# Patient Record
Sex: FEMALE | Race: BLACK OR AFRICAN AMERICAN | NOT HISPANIC OR LATINO
[De-identification: names, ages, dates, MRNs, and addresses within clinical notes are randomized per-mention and may not be internally consistent; named-entity substitution may affect disease eponyms.]

---

## 2017-01-30 ENCOUNTER — APPOINTMENT (OUTPATIENT)
Dept: ORTHOPEDIC SURGERY | Facility: CLINIC | Age: 52
End: 2017-01-30

## 2017-01-30 RX ORDER — POLYETHYLENE GLYCOL 3350, SODIUM CHLORIDE, SODIUM BICARBONATE AND POTASSIUM CHLORIDE WITH LEMON FLAVOR 420; 11.2; 5.72; 1.48 G/4L; G/4L; G/4L; G/4L
420 POWDER, FOR SOLUTION ORAL
Qty: 4000 | Refills: 0 | Status: ACTIVE | COMMUNITY
Start: 2016-09-29

## 2017-01-30 RX ORDER — LISINOPRIL 2.5 MG/1
2.5 TABLET ORAL
Qty: 30 | Refills: 0 | Status: ACTIVE | COMMUNITY
Start: 2016-08-31

## 2017-01-30 RX ORDER — BLOOD SUGAR DIAGNOSTIC
STRIP MISCELLANEOUS
Qty: 100 | Refills: 0 | Status: ACTIVE | COMMUNITY
Start: 2016-08-16

## 2017-01-30 RX ORDER — ATORVASTATIN CALCIUM 10 MG/1
10 TABLET, FILM COATED ORAL
Qty: 30 | Refills: 0 | Status: ACTIVE | COMMUNITY
Start: 2016-08-31

## 2017-01-30 RX ORDER — GLIMEPIRIDE 2 MG/1
2 TABLET ORAL
Qty: 60 | Refills: 0 | Status: ACTIVE | COMMUNITY
Start: 2016-08-31

## 2017-01-30 RX ORDER — LANCETS 28 GAUGE
EACH MISCELLANEOUS
Qty: 100 | Refills: 0 | Status: ACTIVE | COMMUNITY
Start: 2016-08-16

## 2017-01-30 RX ORDER — ASPIRIN ENTERIC COATED TABLETS 81 MG 81 MG/1
81 TABLET, DELAYED RELEASE ORAL
Qty: 30 | Refills: 0 | Status: ACTIVE | COMMUNITY
Start: 2016-08-31

## 2017-01-30 RX ORDER — METFORMIN ER 500 MG 500 MG/1
500 TABLET ORAL
Qty: 120 | Refills: 0 | Status: ACTIVE | COMMUNITY
Start: 2016-10-06

## 2017-01-30 RX ORDER — DAPAGLIFLOZIN 5 MG/1
5 TABLET, FILM COATED ORAL
Qty: 30 | Refills: 0 | Status: ACTIVE | COMMUNITY
Start: 2017-01-04

## 2017-01-30 RX ORDER — VARENICLINE TARTRATE 0.5 MG/1
0.5 TABLET, FILM COATED ORAL
Qty: 60 | Refills: 0 | Status: ACTIVE | COMMUNITY
Start: 2017-01-04

## 2017-04-03 ENCOUNTER — APPOINTMENT (OUTPATIENT)
Dept: ORTHOPEDIC SURGERY | Facility: CLINIC | Age: 52
End: 2017-04-03

## 2017-06-28 ENCOUNTER — APPOINTMENT (OUTPATIENT)
Dept: ULTRASOUND IMAGING | Facility: CLINIC | Age: 52
End: 2017-06-28

## 2017-06-28 ENCOUNTER — OUTPATIENT (OUTPATIENT)
Dept: OUTPATIENT SERVICES | Facility: HOSPITAL | Age: 52
LOS: 1 days | End: 2017-06-28
Payer: MEDICAID

## 2017-06-28 DIAGNOSIS — Z98.89 OTHER SPECIFIED POSTPROCEDURAL STATES: Chronic | ICD-10-CM

## 2017-06-28 DIAGNOSIS — Z00.8 ENCOUNTER FOR OTHER GENERAL EXAMINATION: ICD-10-CM

## 2017-06-28 PROCEDURE — 76700 US EXAM ABDOM COMPLETE: CPT

## 2017-06-28 PROCEDURE — 76856 US EXAM PELVIC COMPLETE: CPT

## 2017-06-28 PROCEDURE — 76830 TRANSVAGINAL US NON-OB: CPT

## 2017-07-10 ENCOUNTER — APPOINTMENT (OUTPATIENT)
Dept: ORTHOPEDIC SURGERY | Facility: CLINIC | Age: 52
End: 2017-07-10
Payer: OTHER MISCELLANEOUS

## 2017-07-10 PROCEDURE — 99213 OFFICE O/P EST LOW 20 MIN: CPT

## 2017-10-02 ENCOUNTER — APPOINTMENT (OUTPATIENT)
Dept: ORTHOPEDIC SURGERY | Facility: CLINIC | Age: 52
End: 2017-10-02
Payer: OTHER MISCELLANEOUS

## 2017-10-02 DIAGNOSIS — M25.511 PAIN IN RIGHT SHOULDER: ICD-10-CM

## 2017-10-02 DIAGNOSIS — M25.512 PAIN IN LEFT SHOULDER: ICD-10-CM

## 2017-10-02 PROCEDURE — 73030 X-RAY EXAM OF SHOULDER: CPT | Mod: LT

## 2017-10-02 PROCEDURE — 99213 OFFICE O/P EST LOW 20 MIN: CPT

## 2017-10-11 ENCOUNTER — APPOINTMENT (OUTPATIENT)
Dept: MRI IMAGING | Facility: CLINIC | Age: 52
End: 2017-10-11
Payer: MEDICAID

## 2017-10-11 ENCOUNTER — OUTPATIENT (OUTPATIENT)
Dept: OUTPATIENT SERVICES | Facility: HOSPITAL | Age: 52
LOS: 1 days | End: 2017-10-11
Payer: MEDICAID

## 2017-10-11 DIAGNOSIS — Z98.89 OTHER SPECIFIED POSTPROCEDURAL STATES: Chronic | ICD-10-CM

## 2017-10-11 DIAGNOSIS — M25.511 PAIN IN RIGHT SHOULDER: ICD-10-CM

## 2017-10-11 PROCEDURE — 73221 MRI JOINT UPR EXTREM W/O DYE: CPT | Mod: 26,RT

## 2017-10-11 PROCEDURE — 73221 MRI JOINT UPR EXTREM W/O DYE: CPT

## 2017-10-30 ENCOUNTER — APPOINTMENT (OUTPATIENT)
Dept: ORTHOPEDIC SURGERY | Facility: CLINIC | Age: 52
End: 2017-10-30
Payer: MEDICAID

## 2017-10-30 PROCEDURE — 99213 OFFICE O/P EST LOW 20 MIN: CPT

## 2018-01-08 ENCOUNTER — APPOINTMENT (OUTPATIENT)
Dept: ORTHOPEDIC SURGERY | Facility: CLINIC | Age: 53
End: 2018-01-08
Payer: OTHER MISCELLANEOUS

## 2018-01-08 PROCEDURE — 99213 OFFICE O/P EST LOW 20 MIN: CPT

## 2018-01-08 RX ORDER — METHYLPREDNISOLONE 4 MG/1
4 TABLET ORAL
Qty: 1 | Refills: 0 | Status: ACTIVE | COMMUNITY
Start: 2018-01-08 | End: 1900-01-01

## 2018-01-29 ENCOUNTER — APPOINTMENT (OUTPATIENT)
Dept: ORTHOPEDIC SURGERY | Facility: CLINIC | Age: 53
End: 2018-01-29
Payer: MEDICAID

## 2018-01-29 PROCEDURE — 99213 OFFICE O/P EST LOW 20 MIN: CPT

## 2018-02-05 ENCOUNTER — APPOINTMENT (OUTPATIENT)
Dept: ORTHOPEDIC SURGERY | Facility: CLINIC | Age: 53
End: 2018-02-05

## 2018-04-02 ENCOUNTER — APPOINTMENT (OUTPATIENT)
Dept: ORTHOPEDIC SURGERY | Facility: CLINIC | Age: 53
End: 2018-04-02

## 2018-04-09 ENCOUNTER — APPOINTMENT (OUTPATIENT)
Dept: ORTHOPEDIC SURGERY | Facility: CLINIC | Age: 53
End: 2018-04-09
Payer: MEDICAID

## 2018-04-09 PROCEDURE — 99213 OFFICE O/P EST LOW 20 MIN: CPT

## 2018-05-30 ENCOUNTER — APPOINTMENT (OUTPATIENT)
Dept: ORTHOPEDIC SURGERY | Facility: CLINIC | Age: 53
End: 2018-05-30
Payer: OTHER MISCELLANEOUS

## 2018-05-30 DIAGNOSIS — S46.811D STRAIN OF OTHER MUSCLES, FASCIA AND TENDONS AT SHOULDER AND UPPER ARM LEVEL, RIGHT ARM, SUBSEQUENT ENCOUNTER: ICD-10-CM

## 2018-05-30 PROCEDURE — 99213 OFFICE O/P EST LOW 20 MIN: CPT

## 2018-07-23 ENCOUNTER — APPOINTMENT (OUTPATIENT)
Dept: ORTHOPEDIC SURGERY | Facility: CLINIC | Age: 53
End: 2018-07-23

## 2018-08-22 ENCOUNTER — APPOINTMENT (OUTPATIENT)
Dept: PAIN MANAGEMENT | Facility: CLINIC | Age: 53
End: 2018-08-22

## 2019-04-01 ENCOUNTER — APPOINTMENT (OUTPATIENT)
Dept: ORTHOPEDIC SURGERY | Facility: CLINIC | Age: 54
End: 2019-04-01
Payer: OTHER MISCELLANEOUS

## 2019-04-01 DIAGNOSIS — M67.912 UNSPECIFIED DISORDER OF SYNOVIUM AND TENDON, LEFT SHOULDER: ICD-10-CM

## 2019-04-01 DIAGNOSIS — M25.512 PAIN IN RIGHT SHOULDER: ICD-10-CM

## 2019-04-01 DIAGNOSIS — M75.101 UNSPECIFIED ROTATOR CUFF TEAR OR RUPTURE OF RIGHT SHOULDER, NOT SPECIFIED AS TRAUMATIC: ICD-10-CM

## 2019-04-01 DIAGNOSIS — M67.911 UNSPECIFIED DISORDER OF SYNOVIUM AND TENDON, RIGHT SHOULDER: ICD-10-CM

## 2019-04-01 DIAGNOSIS — M25.511 PAIN IN RIGHT SHOULDER: ICD-10-CM

## 2019-04-01 DIAGNOSIS — G89.29 PAIN IN RIGHT SHOULDER: ICD-10-CM

## 2019-04-01 DIAGNOSIS — M75.100 UNSPECIFIED ROTATOR CUFF TEAR OR RUPTURE OF UNSPECIFIED SHOULDER, NOT SPECIFIED AS TRAUMATIC: ICD-10-CM

## 2019-04-01 PROCEDURE — 99213 OFFICE O/P EST LOW 20 MIN: CPT

## 2019-06-29 ENCOUNTER — INPATIENT (INPATIENT)
Facility: HOSPITAL | Age: 54
LOS: 3 days | Discharge: HOME | DRG: 581 | End: 2019-07-03
Attending: HOSPITALIST | Admitting: HOSPITALIST
Payer: COMMERCIAL

## 2019-06-29 VITALS
WEIGHT: 220.02 LBS | DIASTOLIC BLOOD PRESSURE: 82 MMHG | HEART RATE: 95 BPM | OXYGEN SATURATION: 97 % | SYSTOLIC BLOOD PRESSURE: 121 MMHG | RESPIRATION RATE: 20 BRPM | HEIGHT: 66 IN | TEMPERATURE: 99 F

## 2019-06-29 DIAGNOSIS — Z98.89 OTHER SPECIFIED POSTPROCEDURAL STATES: Chronic | ICD-10-CM

## 2019-06-29 LAB
ACETONE SERPL-MCNC: NEGATIVE — SIGNIFICANT CHANGE UP
ALBUMIN SERPL ELPH-MCNC: 4.1 G/DL — SIGNIFICANT CHANGE UP (ref 3.3–5.2)
ALP SERPL-CCNC: 98 U/L — SIGNIFICANT CHANGE UP (ref 40–120)
ALT FLD-CCNC: 30 U/L — SIGNIFICANT CHANGE UP
ANION GAP SERPL CALC-SCNC: 11 MMOL/L — SIGNIFICANT CHANGE UP (ref 5–17)
AST SERPL-CCNC: 22 U/L — SIGNIFICANT CHANGE UP
BASOPHILS # BLD AUTO: 0.02 K/UL — SIGNIFICANT CHANGE UP (ref 0–0.2)
BASOPHILS NFR BLD AUTO: 0.2 % — SIGNIFICANT CHANGE UP (ref 0–2)
BILIRUB SERPL-MCNC: 0.2 MG/DL — LOW (ref 0.4–2)
BUN SERPL-MCNC: 11 MG/DL — SIGNIFICANT CHANGE UP (ref 8–20)
CALCIUM SERPL-MCNC: 9.6 MG/DL — SIGNIFICANT CHANGE UP (ref 8.6–10.2)
CHLORIDE SERPL-SCNC: 101 MMOL/L — SIGNIFICANT CHANGE UP (ref 98–107)
CO2 SERPL-SCNC: 26 MMOL/L — SIGNIFICANT CHANGE UP (ref 22–29)
CREAT SERPL-MCNC: 0.77 MG/DL — SIGNIFICANT CHANGE UP (ref 0.5–1.3)
EOSINOPHIL # BLD AUTO: 0.11 K/UL — SIGNIFICANT CHANGE UP (ref 0–0.5)
EOSINOPHIL NFR BLD AUTO: 1.2 % — SIGNIFICANT CHANGE UP (ref 0–6)
GLUCOSE SERPL-MCNC: 225 MG/DL — HIGH (ref 70–115)
HCG SERPL-ACNC: <4 MIU/ML — SIGNIFICANT CHANGE UP
HCT VFR BLD CALC: 42.5 % — SIGNIFICANT CHANGE UP (ref 34.5–45)
HGB BLD-MCNC: 14 G/DL — SIGNIFICANT CHANGE UP (ref 11.5–15.5)
IMM GRANULOCYTES NFR BLD AUTO: 0.3 % — SIGNIFICANT CHANGE UP (ref 0–1.5)
LYMPHOCYTES # BLD AUTO: 2.35 K/UL — SIGNIFICANT CHANGE UP (ref 1–3.3)
LYMPHOCYTES # BLD AUTO: 26.3 % — SIGNIFICANT CHANGE UP (ref 13–44)
MCHC RBC-ENTMCNC: 31 PG — SIGNIFICANT CHANGE UP (ref 27–34)
MCHC RBC-ENTMCNC: 32.9 GM/DL — SIGNIFICANT CHANGE UP (ref 32–36)
MCV RBC AUTO: 94.2 FL — SIGNIFICANT CHANGE UP (ref 80–100)
MONOCYTES # BLD AUTO: 0.8 K/UL — SIGNIFICANT CHANGE UP (ref 0–0.9)
MONOCYTES NFR BLD AUTO: 8.9 % — SIGNIFICANT CHANGE UP (ref 2–14)
NEUTROPHILS # BLD AUTO: 5.63 K/UL — SIGNIFICANT CHANGE UP (ref 1.8–7.4)
NEUTROPHILS NFR BLD AUTO: 63.1 % — SIGNIFICANT CHANGE UP (ref 43–77)
PLATELET # BLD AUTO: 258 K/UL — SIGNIFICANT CHANGE UP (ref 150–400)
POTASSIUM SERPL-MCNC: 4.3 MMOL/L — SIGNIFICANT CHANGE UP (ref 3.5–5.3)
POTASSIUM SERPL-SCNC: 4.3 MMOL/L — SIGNIFICANT CHANGE UP (ref 3.5–5.3)
PROT SERPL-MCNC: 7 G/DL — SIGNIFICANT CHANGE UP (ref 6.6–8.7)
RBC # BLD: 4.51 M/UL — SIGNIFICANT CHANGE UP (ref 3.8–5.2)
RBC # FLD: 13.7 % — SIGNIFICANT CHANGE UP (ref 10.3–14.5)
SODIUM SERPL-SCNC: 138 MMOL/L — SIGNIFICANT CHANGE UP (ref 135–145)
WBC # BLD: 8.94 K/UL — SIGNIFICANT CHANGE UP (ref 3.8–10.5)
WBC # FLD AUTO: 8.94 K/UL — SIGNIFICANT CHANGE UP (ref 3.8–10.5)

## 2019-06-29 PROCEDURE — 99285 EMERGENCY DEPT VISIT HI MDM: CPT

## 2019-06-29 RX ORDER — MORPHINE SULFATE 50 MG/1
4 CAPSULE, EXTENDED RELEASE ORAL ONCE
Refills: 0 | Status: DISCONTINUED | OUTPATIENT
Start: 2019-06-29 | End: 2019-06-29

## 2019-06-29 RX ADMIN — MORPHINE SULFATE 4 MILLIGRAM(S): 50 CAPSULE, EXTENDED RELEASE ORAL at 22:35

## 2019-06-29 RX ADMIN — Medication 100 MILLIGRAM(S): at 22:19

## 2019-06-29 RX ADMIN — MORPHINE SULFATE 4 MILLIGRAM(S): 50 CAPSULE, EXTENDED RELEASE ORAL at 22:20

## 2019-06-29 NOTE — ED STATDOCS - PROGRESS NOTE DETAILS
PT evaluated by intake physician. HPI/PE/ROS as noted above. Will follow up plan per intake physician. reviewed ct results, surg consult placed, spoke with surgery will do bedside incision and drainage admit to medicine for IV antibiotics

## 2019-06-29 NOTE — ED STATDOCS - ATTENDING CONTRIBUTION TO CARE
I, Maggie Oh, performed the initial face to face bedside interview with this patient regarding history of present illness, review of symptoms and relevant past medical, social and family history.  I completed an independent physical examination.  I was the initial provider who evaluated this patient. I have signed out the follow up of any pending tests (i.e. labs, radiological studies) to the ACP.  I have communicated the patient’s plan of care and disposition with the ACP.

## 2019-06-29 NOTE — ED STATDOCS - PSH
H/O Achilles tendon repair  right, 2001  H/O colonoscopy    History of arthroscopy of left shoulder  2013

## 2019-06-29 NOTE — ED STATDOCS - PHYSICAL EXAMINATION
Const: Awake, alert and oriented. In no acute distress. Well appearing.  HEENT: NC/AT. Moist mucous membranes.  Eyes: No scleral icterus. EOMI.  Neck:. Area of fluctuance from 10cm by 3 on posterior aspect and r side of neck and tracking down left trapezius area. No drainage, warm to touch. No overline erythema.   Cardiac: Regular rate and regular rhythm. +S1/S2. No murmurs. Peripheral pulses 2+ and symmetric. No LE edema.  Resp: Speaking in full sentences. No evidence of respiratory distress. No wheezes, rales or rhonchi.  Abd: Soft, non-tender, non-distended. Normal bowel sounds in all 4 quadrants. No guarding or rebound.  Back: Spine midline and non-tender. No CVAT.  Skin: No rashes, abrasions or lacerations.  Lymph: No cervical lymphadenopathy.  Neuro: Awake, alert & oriented x 3. Moves all extremities symmetrically. Const: Awake, alert and oriented. In no acute distress. Well appearing.  HEENT: NC/AT. Moist mucous membranes.  Eyes: No scleral icterus. EOMI.  Neck:. Area of fluctuance 10cm x 3cm on posterior aspect and right side of neck and tracking down left trapezius area. No drainage, warm to touch. No overlying erythema.   Cardiac: Regular rate and regular rhythm. +S1/S2. No murmurs. Peripheral pulses 2+ and symmetric. No LE edema.  Resp: Speaking in full sentences. No evidence of respiratory distress. No wheezes, rales or rhonchi.  Abd: Soft, non-tender, non-distended. Normal bowel sounds in all 4 quadrants. No guarding or rebound.  Back: Spine midline and non-tender. No CVAT.  Skin: No rashes, abrasions or lacerations.  Lymph: No cervical lymphadenopathy.  Neuro: Awake, alert & oriented x 3. CN II-XII symmetrically intact, normal speech, tongue midline. Moves all extremities symmetrically.

## 2019-06-29 NOTE — ED STATDOCS - CLINICAL SUMMARY MEDICAL DECISION MAKING FREE TEXT BOX
Patient with large neck abscess for last four days, at site of previous abscess. No fever, difficulties swallowing. However will check labs and CT to evaluate for extent of abscess and will start IV antibiotics. Patient with large neck abscess for last four days, at site of previous abscess. No fever, or difficulties swallowing or other neuro deficits. However will check labs and CT to evaluate for extent of abscess due to location and will start IV antibiotics. As abscess overlies spine, will not attempt I&D in ED without imaging results and consider surgical consult due to extent if abscess is tracking into deep spaces of neck.

## 2019-06-29 NOTE — ED STATDOCS - PMH
Achilles tendon injury  right  ADHD (attention deficit hyperactivity disorder)    H/O tear of meniscus of knee joint  left  Obesity (BMI 30-39.9)

## 2019-06-29 NOTE — ED STATDOCS - NS ED ROS FT
Const: Denies fever, chills  HEENT: Denies blurry vision, sore throat  Neck: Neck pain, mass to back of neck.   Resp: Denies coughing, SOB  Cardiovascular: Denies CP, palpitations, LE edema  GI: Denies nausea, vomiting, abdominal pain, diarrhea, constipation, blood in stool  : Denies urinary frequency/urgency/dysuria, hematuria  MSK: Denies back pain  Neuro: Denies HA, dizziness, numbness, weakness  Skin: Denies rashes. Const: Denies fever, chills  HEENT: Denies blurry vision, sore throat  Neck: + Neck pain, abscess to back of neck.   Resp: Denies coughing, SOB  Cardiovascular: Denies CP, palpitations, LE edema  GI: Denies nausea, vomiting, abdominal pain, diarrhea, constipation, blood in stool  : Denies urinary frequency/urgency/dysuria, hematuria  MSK: Denies back pain  Neuro: Denies HA, dizziness, numbness, weakness  Skin: Denies rashes.

## 2019-06-29 NOTE — ED STATDOCS - OBJECTIVE STATEMENT
53 year old female with past medical history of diabetes mellitus (Metformin) presents to the emergency room with complaints of neck pain and mass on neck for 3 days. Patient states mass has grown in size since noticing. Patient has history of abbesses but never to this extent. Patient took Tramadol this morning for pinched nerve in spine. Patient checks sugar levels at home with results of 296 this morning. Denies fever, sore throat, difficulties swallowing, cough, nausea, vomiting, diarrhea, headache, abdominal pain or chills. No additional complaints at this time.  Primary care: Rogelio 53 year old female with past medical history of diabetes mellitus (on insulin and Metformin) presents to the emergency room with complaints of neck pain and abscess on neck for 3 days. Patient states abscess has grown in size since she first noticed it. Patient has history of abbesses but never to this extent. Patient took Tramadol this morning for pinched nerve in spine, which did not improve her neck pain. Patient checks sugar levels at home with results of 296 this morning. Denies fever, sore throat, difficulties swallowing, cough, nausea, vomiting, diarrhea, headache, abdominal pain or chills. She has no allergies to medications, denies smoking, EtOH or illicit drugs.

## 2019-06-30 DIAGNOSIS — L02.91 CUTANEOUS ABSCESS, UNSPECIFIED: ICD-10-CM

## 2019-06-30 LAB
GLUCOSE BLDC GLUCOMTR-MCNC: 180 MG/DL — HIGH (ref 70–99)
GLUCOSE BLDC GLUCOMTR-MCNC: 256 MG/DL — HIGH (ref 70–99)
GLUCOSE BLDC GLUCOMTR-MCNC: 269 MG/DL — HIGH (ref 70–99)
LDH SERPL L TO P-CCNC: 193 U/L — HIGH (ref 98–192)

## 2019-06-30 PROCEDURE — 99221 1ST HOSP IP/OBS SF/LOW 40: CPT

## 2019-06-30 PROCEDURE — 99223 1ST HOSP IP/OBS HIGH 75: CPT

## 2019-06-30 PROCEDURE — 70491 CT SOFT TISSUE NECK W/DYE: CPT | Mod: 26

## 2019-06-30 RX ORDER — SODIUM CHLORIDE 9 MG/ML
1000 INJECTION, SOLUTION INTRAVENOUS
Refills: 0 | Status: DISCONTINUED | OUTPATIENT
Start: 2019-06-30 | End: 2019-07-03

## 2019-06-30 RX ORDER — PIPERACILLIN AND TAZOBACTAM 4; .5 G/20ML; G/20ML
4.5 INJECTION, POWDER, LYOPHILIZED, FOR SOLUTION INTRAVENOUS EVERY 8 HOURS
Refills: 0 | Status: DISCONTINUED | OUTPATIENT
Start: 2019-06-30 | End: 2019-06-30

## 2019-06-30 RX ORDER — ACETAMINOPHEN 500 MG
650 TABLET ORAL EVERY 6 HOURS
Refills: 0 | Status: DISCONTINUED | OUTPATIENT
Start: 2019-06-30 | End: 2019-07-03

## 2019-06-30 RX ORDER — INSULIN GLARGINE 100 [IU]/ML
30 INJECTION, SOLUTION SUBCUTANEOUS AT BEDTIME
Refills: 0 | Status: DISCONTINUED | OUTPATIENT
Start: 2019-06-30 | End: 2019-07-01

## 2019-06-30 RX ORDER — DEXTROSE 50 % IN WATER 50 %
15 SYRINGE (ML) INTRAVENOUS ONCE
Refills: 0 | Status: DISCONTINUED | OUTPATIENT
Start: 2019-06-30 | End: 2019-07-03

## 2019-06-30 RX ORDER — DEXTROSE 50 % IN WATER 50 %
25 SYRINGE (ML) INTRAVENOUS ONCE
Refills: 0 | Status: DISCONTINUED | OUTPATIENT
Start: 2019-06-30 | End: 2019-07-03

## 2019-06-30 RX ORDER — CYCLOBENZAPRINE HYDROCHLORIDE 10 MG/1
5 TABLET, FILM COATED ORAL THREE TIMES A DAY
Refills: 0 | Status: DISCONTINUED | OUTPATIENT
Start: 2019-06-30 | End: 2019-07-03

## 2019-06-30 RX ORDER — PIPERACILLIN AND TAZOBACTAM 4; .5 G/20ML; G/20ML
4.5 INJECTION, POWDER, LYOPHILIZED, FOR SOLUTION INTRAVENOUS ONCE
Refills: 0 | Status: COMPLETED | OUTPATIENT
Start: 2019-06-30 | End: 2019-06-30

## 2019-06-30 RX ORDER — PIPERACILLIN AND TAZOBACTAM 4; .5 G/20ML; G/20ML
4.5 INJECTION, POWDER, LYOPHILIZED, FOR SOLUTION INTRAVENOUS ONCE
Refills: 0 | Status: DISCONTINUED | OUTPATIENT
Start: 2019-06-30 | End: 2019-06-30

## 2019-06-30 RX ORDER — VANCOMYCIN HCL 1 G
1000 VIAL (EA) INTRAVENOUS EVERY 12 HOURS
Refills: 0 | Status: DISCONTINUED | OUTPATIENT
Start: 2019-06-30 | End: 2019-06-30

## 2019-06-30 RX ORDER — MORPHINE SULFATE 50 MG/1
4 CAPSULE, EXTENDED RELEASE ORAL EVERY 4 HOURS
Refills: 0 | Status: DISCONTINUED | OUTPATIENT
Start: 2019-06-30 | End: 2019-07-01

## 2019-06-30 RX ORDER — VANCOMYCIN HCL 1 G
1000 VIAL (EA) INTRAVENOUS EVERY 8 HOURS
Refills: 0 | Status: DISCONTINUED | OUTPATIENT
Start: 2019-06-30 | End: 2019-06-30

## 2019-06-30 RX ORDER — MORPHINE SULFATE 50 MG/1
2 CAPSULE, EXTENDED RELEASE ORAL EVERY 4 HOURS
Refills: 0 | Status: DISCONTINUED | OUTPATIENT
Start: 2019-06-30 | End: 2019-07-03

## 2019-06-30 RX ORDER — KETOROLAC TROMETHAMINE 30 MG/ML
30 SYRINGE (ML) INJECTION ONCE
Refills: 0 | Status: DISCONTINUED | OUTPATIENT
Start: 2019-06-30 | End: 2019-06-30

## 2019-06-30 RX ORDER — CEFAZOLIN SODIUM 1 G
2000 VIAL (EA) INJECTION EVERY 8 HOURS
Refills: 0 | Status: DISCONTINUED | OUTPATIENT
Start: 2019-06-30 | End: 2019-07-03

## 2019-06-30 RX ORDER — VANCOMYCIN HCL 1 G
VIAL (EA) INTRAVENOUS
Refills: 0 | Status: DISCONTINUED | OUTPATIENT
Start: 2019-06-30 | End: 2019-06-30

## 2019-06-30 RX ORDER — VANCOMYCIN HCL 1 G
1000 VIAL (EA) INTRAVENOUS ONCE
Refills: 0 | Status: COMPLETED | OUTPATIENT
Start: 2019-06-30 | End: 2019-06-30

## 2019-06-30 RX ORDER — LIDOCAINE HCL 20 MG/ML
20 VIAL (ML) INJECTION ONCE
Refills: 0 | Status: DISCONTINUED | OUTPATIENT
Start: 2019-06-30 | End: 2019-06-30

## 2019-06-30 RX ORDER — GLUCAGON INJECTION, SOLUTION 0.5 MG/.1ML
1 INJECTION, SOLUTION SUBCUTANEOUS ONCE
Refills: 0 | Status: DISCONTINUED | OUTPATIENT
Start: 2019-06-30 | End: 2019-07-03

## 2019-06-30 RX ORDER — INSULIN LISPRO 100/ML
VIAL (ML) SUBCUTANEOUS
Refills: 0 | Status: DISCONTINUED | OUTPATIENT
Start: 2019-06-30 | End: 2019-07-03

## 2019-06-30 RX ORDER — LIDOCAINE 4 G/100G
1 CREAM TOPICAL DAILY
Refills: 0 | Status: DISCONTINUED | OUTPATIENT
Start: 2019-06-30 | End: 2019-07-03

## 2019-06-30 RX ORDER — DEXTROSE 50 % IN WATER 50 %
12.5 SYRINGE (ML) INTRAVENOUS ONCE
Refills: 0 | Status: DISCONTINUED | OUTPATIENT
Start: 2019-06-30 | End: 2019-07-03

## 2019-06-30 RX ORDER — INSULIN LISPRO 100/ML
8 VIAL (ML) SUBCUTANEOUS
Refills: 0 | Status: DISCONTINUED | OUTPATIENT
Start: 2019-06-30 | End: 2019-07-01

## 2019-06-30 RX ADMIN — MORPHINE SULFATE 2 MILLIGRAM(S): 50 CAPSULE, EXTENDED RELEASE ORAL at 08:56

## 2019-06-30 RX ADMIN — PIPERACILLIN AND TAZOBACTAM 25 GRAM(S): 4; .5 INJECTION, POWDER, LYOPHILIZED, FOR SOLUTION INTRAVENOUS at 13:00

## 2019-06-30 RX ADMIN — MORPHINE SULFATE 2 MILLIGRAM(S): 50 CAPSULE, EXTENDED RELEASE ORAL at 07:57

## 2019-06-30 RX ADMIN — Medication 6: at 11:33

## 2019-06-30 RX ADMIN — Medication 100 MILLIGRAM(S): at 22:41

## 2019-06-30 RX ADMIN — Medication 250 MILLIGRAM(S): at 13:01

## 2019-06-30 RX ADMIN — INSULIN GLARGINE 30 UNIT(S): 100 INJECTION, SOLUTION SUBCUTANEOUS at 22:40

## 2019-06-30 RX ADMIN — Medication 30 MILLIGRAM(S): at 03:26

## 2019-06-30 RX ADMIN — LIDOCAINE 1 PATCH: 4 CREAM TOPICAL at 12:58

## 2019-06-30 RX ADMIN — MORPHINE SULFATE 4 MILLIGRAM(S): 50 CAPSULE, EXTENDED RELEASE ORAL at 22:53

## 2019-06-30 RX ADMIN — Medication 250 MILLIGRAM(S): at 07:57

## 2019-06-30 RX ADMIN — LIDOCAINE 1 PATCH: 4 CREAM TOPICAL at 21:11

## 2019-06-30 RX ADMIN — MORPHINE SULFATE 2 MILLIGRAM(S): 50 CAPSULE, EXTENDED RELEASE ORAL at 17:58

## 2019-06-30 RX ADMIN — PIPERACILLIN AND TAZOBACTAM 200 GRAM(S): 4; .5 INJECTION, POWDER, LYOPHILIZED, FOR SOLUTION INTRAVENOUS at 07:56

## 2019-06-30 NOTE — H&P ADULT - ASSESSMENT
1) Neck abscess - unclear why blood cultures not ordered  --> will order now  --> surgery consult appreicated  --> will consult id  --> iv abx vanco and zosyn    2) dm2 --> lantus, ssi  --> check a1c    3) back pain --> mri lower bakc ordered    4) pain --> lidociane patch  --> felxeril prn     5) dvt prop --> scds    6) diet -->< consistent carb

## 2019-06-30 NOTE — H&P ADULT - NSHPREVIEWOFSYSTEMS_GEN_ALL_CORE
REVIEW OF SYSTEMS:    CONSTITUTIONAL: pain  EYES: No eye pain, visual disturbances, or discharge  ENMT:  No difficulty hearing, tinnitus, vertigo; No sinus or throat pain  NECK: neck pain   BREASTS: No pain, masses, or nipple discharge  RESPIRATORY: No cough, wheezing, chills or hemoptysis; No shortness of breath  CARDIOVASCULAR: No chest pain, palpitations, dizziness, or leg swelling  GASTROINTESTINAL: No abdominal or epigastric pain. No nausea, vomiting, or hematemesis; No diarrhea or constipation. No melena or hematochezia.  GENITOURINARY: No dysuria, frequency, hematuria, or incontinence  NEUROLOGICAL: No headaches, memory loss, loss of strength, numbness, or tremors  SKIN: No itching, burning, rashes, or lesions   LYMPH NODES: No enlarged glands  ENDOCRINE: No heat or cold intolerance; No hair loss  MUSCULOSKELETAL: back pain   PSYCHIATRIC: No depression, anxiety, mood swings, or difficulty sleeping  HEME/LYMPH: No easy bruising, or bleeding gums  ALLERY AND IMMUNOLOGIC: No hives or eczema

## 2019-06-30 NOTE — CONSULT NOTE ADULT - ASSESSMENT
54y/o  Female with a h/o neck abscess, Uncontrolled DM type 2 on insulin,  Active smoker. Patient comes in from home with neck swelling and discomfort. Patient was in her usual state of health and started to develop the swelling a 3-4 days ago. Does not recall fever. No chills. In the ER patient was afebrile, no leukocytosis. Started on IV Vancomycin and Zosyn.       Neck Abscess  Uncontrolled DM type 2 on insulin      - Blood cultures pending  - CT neck with abscess  - Surgery following  - Will D/C Vancomycin and Zosyn  - Start Cefazolin 2gm IV q 8hours  - Start Clindamycin   - Trend Fever  - Trend Leukocytosis      Will Follow

## 2019-06-30 NOTE — CONSULT NOTE ADULT - ATTENDING COMMENTS
posterior neck abscess.  patient will benefit fro Incision and drainage however refusing to have it done at bedside. no need for OR.  agree with IV antibiotics and will try to convince patient to have drainage at a latter time

## 2019-06-30 NOTE — H&P ADULT - NSICDXPASTMEDICALHX_GEN_ALL_CORE_FT
PAST MEDICAL HISTORY:  Achilles tendon injury right    ADHD (attention deficit hyperactivity disorder)     H/O tear of meniscus of knee joint left    Obesity (BMI 30-39.9)

## 2019-06-30 NOTE — H&P ADULT - NSICDXFAMILYHX_GEN_ALL_CORE_FT
FAMILY HISTORY:  Sibling  Still living? Unknown  Family history of diabetes mellitus, Age at diagnosis: Age Unknown    Grandparent  Still living? No  Family history of breast cancer, Age at diagnosis: Age Unknown

## 2019-06-30 NOTE — H&P ADULT - HISTORY OF PRESENT ILLNESS
53 yof with pmh of neck abscess in the past, dm2 on insulin, presents from home with neck swelling and discomfort. Patient states she was previously treated for a neck abscess in a hospital in [facility]. Patient was in her usual state of health and started to develop the swelling a "few days back". Patient denies sob or dysphagia. Patient worked up in the er and found to have a neck abscess but refused bedside I and D. Patient seen at bedside and states feeling ok. Patient complains of chronic back pain. Patient denies any fever, chills, drug use or any other complaints.

## 2019-06-30 NOTE — CONSULT NOTE ADULT - SUBJECTIVE AND OBJECTIVE BOX
53 year old female presenting with chief complaint of neck pain. ACS/Trauma consulted to evaluate for neck abscess. Patient seen and examined at bedside. Reports that tala 6 months ago, she had similar pain and was found to have an abscess which was drained @ [facility] ED and was subsequently sent home. States for the past couple of days, she has been having similar sharp pain and tenderness similar to prior episodes. Of note, patient admits to being a poorly controlled diabetic on insulin with glucose levels consistently in 200-300 range at home. Patient currently refusing bedside I&D as she states this was what happened last time and procedure was very painful yet abscess recurred and does not want to proceed at this time. Demanding to be taken to the operating room for debridement immediately. ROS negative for fever, chills, nausea, vomiting, chest pain, SOB, abd pain, dysuria or changes in bowel habits.     PAST MEDICAL HISTORY:  Achilles tendon injury  Obesity (BMI 30-39.9)  H/O tear of meniscus of knee joint  ADHD (attention deficit hyperactivity disorder)    PAST SURGICAL HISTORY:  H/O colonoscopy  History of arthroscopy of left shoulder  Rotator cuff disorder  H/O Achilles tendon repair    ALLERGIES:  No Known Allergies    MEDICATIONS  (STANDING):    MEDICATIONS  (PRN):    VITALS & I/Os:  Vital Signs Last 24 Hrs  T(C): 36.9 (30 Jun 2019 04:38), Max: 37 (29 Jun 2019 20:01)  T(F): 98.4 (30 Jun 2019 04:38), Max: 98.6 (29 Jun 2019 20:01)  HR: 71 (30 Jun 2019 04:38) (71 - 95)  BP: 116/73 (30 Jun 2019 04:38) (116/73 - 121/82)  BP(mean): --  RR: 18 (30 Jun 2019 04:38) (18 - 20)  SpO2: 95% (30 Jun 2019 04:38) (95% - 97%)  CAPILLARY BLOOD GLUCOSE    I&O's Summary    Constitutional: Patient resting comfortably in bed in no acute distress   HEENT: EOMI/PERRL b/l  Neck: Well healed surgical incision over posterior midline neck with palpable firm tender mass; tenderness also present in posterior right neck; minimal surrounding cellulitis noted   Respiratory: CTAB with unlabored respirations, no accessory muscle use or conversational dyspnea   Cardiovascular: Regular rate and rhythm with no arrythmias or murmurs  Gastrointestinal: Abdomen soft, non-tender, non-distended with no rebound tenderness or guarding   Rectal: Not indicated   Neurological: GCS 15 ([id number]) with no gross sensory, motor or coordination deficits   Psychiatric: Normal mood and affect   Musculoskeletal: No joint pain, swelling or deformity with no limitation of movement     LABS:                        14.0   8.94  )-----------( 258      ( 29 Jun 2019 22:28 )             42.5     06-29    138  |  101  |  11.0  ----------------------------<  225<H>  4.3   |  26.0  |  0.77    Ca    9.6      29 Jun 2019 22:28    TPro  7.0  /  Alb  4.1  /  TBili  0.2<L>  /  DBili  x   /  AST  22  /  ALT  30  /  AlkPhos  98  06-29    Lactate:

## 2019-06-30 NOTE — H&P ADULT - NSICDXPASTSURGICALHX_GEN_ALL_CORE_FT
PAST SURGICAL HISTORY:  H/O Achilles tendon repair right, 2001    H/O colonoscopy     History of arthroscopy of left shoulder 2013

## 2019-06-30 NOTE — ED ADULT NURSE NOTE - NSIMPLEMENTINTERV_GEN_ALL_ED
Implemented All Universal Safety Interventions:  [place] to call system. Call bell, personal items and telephone within reach. Instruct patient to call for assistance. Room bathroom lighting operational. Non-slip footwear when patient is off stretcher. Physically safe environment: no spills, clutter or unnecessary equipment. Stretcher in lowest position, wheels locked, appropriate side rails in place.

## 2019-06-30 NOTE — CONSULT NOTE ADULT - SUBJECTIVE AND OBJECTIVE BOX
[facility]  INFECTIOUS DISEASES AND INTERNAL MEDICINE at [place]  =======================================================  Jeremiah Walsh MD  Diplomates American Board of Internal Medicine and Infectious Diseases  Tel: [phone]      Fax: [phone]  =======================================================      [id number]  JENNIFER CHRISTIE     CC: Patient is a 53y old  Female who presents with a chief complaint of neck abscess (30 Jun 2019 12:36)    54y/o  Female with a h/o neck abscess, Uncontrolled DM type 2 on insulin,  Active smoker. Patient comes in from home with neck swelling and discomfort. Patient was in her usual state of health and started to develop the swelling a 3-4 days ago. Does not recall fever. No chills. In the ER patient was afebrile, no leukocytosis. Started on IV Vancomycin and Zosyn. ID input requested.       Past Medical & Surgical Hx:  Achilles tendon injury: right  Obesity (BMI 30-39.9)  H/O tear of meniscus of knee joint: left  ADHD (attention deficit hyperactivity disorder)  H/O colonoscopy  History of arthroscopy of left shoulder: 2013  H/O Achilles tendon repair: right, 2001      Social Hx:  Active smoker      FAMILY HISTORY:  Family history of breast cancer (Grandparent)  Family history of diabetes mellitus (Sibling)      Allergies  No Known Allergies      Antibiotics:  piperacillin/tazobactam IVPB.. 4.5 Gram(s) IV Intermittent every 8 hours  vancomycin  IVPB 1000 milliGRAM(s) IV Intermittent every 8 hours       REVIEW OF SYSTEMS:  CONSTITUTIONAL:  No Fever or chills  HEENT:  No diplopia or blurred vision.  No earache, sore throat or runny nose.  CARDIOVASCULAR:  No pressure, squeezing, strangling, tightness, heaviness or aching about the chest, neck, axilla or epigastrium.  RESPIRATORY:  No cough, shortness of breath  GASTROINTESTINAL:  No nausea, vomiting or diarrhea.  GENITOURINARY:  No dysuria, frequency or urgency.   MUSCULOSKELETAL:  no joint aches, no muscle pain  SKIN:  + Neck Abscess   NEUROLOGIC:  No Headaches, seizures or weakness.  PSYCHIATRIC:  No disorder of thought or mood.  ENDOCRINE:  No heat or cold intolerance  HEMATOLOGICAL:  No easy bruising or bleeding.       Physical Exam:  Vital Signs Last 24 Hrs  T(C): 36.8 (30 Jun 2019 16:28), Max: 37 (29 Jun 2019 20:01)  T(F): 98.2 (30 Jun 2019 16:28), Max: 98.6 (29 Jun 2019 20:01)  HR: 74 (30 Jun 2019 16:28) (71 - 95)  BP: 139/90 (30 Jun 2019 16:28) (116/73 - 139/90)  RR: 18 (30 Jun 2019 16:28) (18 - 20)  SpO2: 97% (30 Jun 2019 16:28) (95% - 97%)  Height (cm): 167.64 (06-29 @ 20:01)  Weight (kg): 99.8 (06-29 @ 20:01)  BMI (kg/m2): 35.5 (06-29 @ 20:01)  BSA (m2): 2.08 (06-29 @ 20:01)      GEN: NAD, pleasant  HEENT: normocephalic and atraumatic. EOMI. PERRL.  Anicteric  NECK: Supple.   LUNGS: Clear to auscultation.  HEART: Regular rate and rhythm   ABDOMEN: Soft, nontender, and nondistended.  Positive bowel sounds.    : No CVA tenderness  EXTREMITIES: Without any edema.  MSK: No joint swelling  NEUROLOGIC:  No Focal Deficits  PSYCHIATRIC: Appropriate affect .  SKIN: Neck Abscess      Labs:  06-29    138  |  101  |  11.0  ----------------------------<  225<H>  4.3   |  26.0  |  0.77    Ca    9.6      29 Jun 2019 22:28    TPro  7.0  /  Alb  4.1  /  TBili  0.2<L>  /  DBili  x   /  AST  22  /  ALT  30  /  AlkPhos  98  06-29                          14.0   8.94  )-----------( 258      ( 29 Jun 2019 22:28 )             42.5     LIVER FUNCTIONS - ( 29 Jun 2019 22:28 )  Alb: 4.1 g/dL / Pro: 7.0 g/dL / ALK PHOS: 98 U/L / ALT: 30 U/L / AST: 22 U/L / GGT: x                 EXAM:  CT NECK SOFT TISSUE IC                        PROCEDURE DATE:  06/30/2019    INTERPRETATION:  CT NECK WITH CONTRAST  CLINICAL INDICATION: Neck soft tissue swelling with abscess.  TECHNIQUE: Contrast enhanced CT performed of the neck.  96 mL of Omnipaque 350 contrast material was injected IV. 4 mL was   discarded. Coronal and sagittal reformats were obtained.  COMPARISON: None.  FINDINGS:    2.0 x 2.9 x 3.5 cm peripherally enhancing collection within the posterior   midline neck, at the C2-C3 level. 2.4 x 1.3 x 1.0 cm peripherally   enhancing collection adjacent to the right of the larger midline   collection. Significant overlying skin thickening and surrounding   inflammatory change.  Numerous likely reactive cervical chain lymph nodes.  Visualized intracranial and intraorbital contents are unremarkable.   Paranasal sinuses are clear.  Nasopharynx and oropharynx are unremarkable. No prevertebral soft tissue   swelling.  Bilateral parotid, submandibular, and thyroid glands are unremarkable.   Visualized lung apices are clear. No significant stenosis within the   visualized major vascular structures.  IMPRESSION:    Two peripherally enhancing collections measuring 2.0 x 2.9 x 3.5 cm and   2.4x 1.3 x 1.0 cm in the posterior midline neck, and right of midline,   respectively, at the level of C2-C3. Overlying skin thickening and   surrounding inflammatory change.

## 2019-06-30 NOTE — CONSULT NOTE ADULT - ASSESSMENT
53 year old female with 2 distinct neck masses currently afebrile with normal WBC requiring incision and drainage (I&D). No acute indication for operating room incision and drainage at this time. ACS/Trauma recommends admission to medical team for IV ABx and reconsulting ACS/Trauma once patient is amenable to bedside I&D. Recommend warm compress for comfort.

## 2019-06-30 NOTE — H&P ADULT - NSHPPHYSICALEXAM_GEN_ALL_CORE
PHYSICAL EXAM:    GENERAL: NAD, lying on stomach   HEAD:  Atraumatic, Normocephalic  EYES: EOMI, PERRLA, conjunctiva and sclera clear  ENMT: neck tenderness, bandaged  NECK: Supple, No JVD, Normal thyroid  NERVOUS SYSTEM:  Alert & Oriented X3, Good concentration; Motor Strength 5/5 B/L upper and lower extremities; DTRs 2+ intact and symmetric  CHEST/LUNG: Clear to percussion bilaterally; No rales, rhonchi, wheezing, or rubs  HEART: Regular rate and rhythm; No murmurs, rubs, or gallops  ABDOMEN: Soft, Nontender, Nondistended; Bowel sounds present  EXTREMITIES:  2+ Peripheral Pulses, No clubbing, cyanosis, or edema  LYMPH: No lymphadenopathy noted  SKIN: No rashes or lesions

## 2019-07-01 LAB
ALBUMIN SERPL ELPH-MCNC: 3.7 G/DL — SIGNIFICANT CHANGE UP (ref 3.3–5.2)
ALP SERPL-CCNC: 90 U/L — SIGNIFICANT CHANGE UP (ref 40–120)
ALT FLD-CCNC: 23 U/L — SIGNIFICANT CHANGE UP
ANION GAP SERPL CALC-SCNC: 12 MMOL/L — SIGNIFICANT CHANGE UP (ref 5–17)
AST SERPL-CCNC: 16 U/L — SIGNIFICANT CHANGE UP
BILIRUB SERPL-MCNC: 0.3 MG/DL — LOW (ref 0.4–2)
BUN SERPL-MCNC: 12 MG/DL — SIGNIFICANT CHANGE UP (ref 8–20)
CALCIUM SERPL-MCNC: 9.1 MG/DL — SIGNIFICANT CHANGE UP (ref 8.6–10.2)
CHLORIDE SERPL-SCNC: 102 MMOL/L — SIGNIFICANT CHANGE UP (ref 98–107)
CO2 SERPL-SCNC: 24 MMOL/L — SIGNIFICANT CHANGE UP (ref 22–29)
CREAT SERPL-MCNC: 0.82 MG/DL — SIGNIFICANT CHANGE UP (ref 0.5–1.3)
GLUCOSE BLDC GLUCOMTR-MCNC: 213 MG/DL — HIGH (ref 70–99)
GLUCOSE BLDC GLUCOMTR-MCNC: 271 MG/DL — HIGH (ref 70–99)
GLUCOSE BLDC GLUCOMTR-MCNC: 317 MG/DL — HIGH (ref 70–99)
GLUCOSE BLDC GLUCOMTR-MCNC: 74 MG/DL — SIGNIFICANT CHANGE UP (ref 70–99)
GLUCOSE BLDC GLUCOMTR-MCNC: 82 MG/DL — SIGNIFICANT CHANGE UP (ref 70–99)
GLUCOSE SERPL-MCNC: 207 MG/DL — HIGH (ref 70–115)
HAV IGM SER-ACNC: SIGNIFICANT CHANGE UP
HBA1C BLD-MCNC: 11.1 % — HIGH (ref 4–5.6)
HBV CORE IGM SER-ACNC: SIGNIFICANT CHANGE UP
HBV SURFACE AG SER-ACNC: SIGNIFICANT CHANGE UP
HCT VFR BLD CALC: 43.1 % — SIGNIFICANT CHANGE UP (ref 34.5–45)
HCV AB S/CO SERPL IA: 0.07 S/CO — SIGNIFICANT CHANGE UP (ref 0–0.99)
HCV AB SERPL-IMP: SIGNIFICANT CHANGE UP
HGB BLD-MCNC: 14.1 G/DL — SIGNIFICANT CHANGE UP (ref 11.5–15.5)
MAGNESIUM SERPL-MCNC: 2.1 MG/DL — SIGNIFICANT CHANGE UP (ref 1.6–2.6)
MCHC RBC-ENTMCNC: 30.5 PG — SIGNIFICANT CHANGE UP (ref 27–34)
MCHC RBC-ENTMCNC: 32.7 GM/DL — SIGNIFICANT CHANGE UP (ref 32–36)
MCV RBC AUTO: 93.1 FL — SIGNIFICANT CHANGE UP (ref 80–100)
PLATELET # BLD AUTO: 263 K/UL — SIGNIFICANT CHANGE UP (ref 150–400)
POTASSIUM SERPL-MCNC: 4.2 MMOL/L — SIGNIFICANT CHANGE UP (ref 3.5–5.3)
POTASSIUM SERPL-SCNC: 4.2 MMOL/L — SIGNIFICANT CHANGE UP (ref 3.5–5.3)
PROCALCITONIN SERPL-MCNC: 0.04 NG/ML — SIGNIFICANT CHANGE UP (ref 0.02–0.1)
PROT SERPL-MCNC: 6.8 G/DL — SIGNIFICANT CHANGE UP (ref 6.6–8.7)
RBC # BLD: 4.63 M/UL — SIGNIFICANT CHANGE UP (ref 3.8–5.2)
RBC # FLD: 13.3 % — SIGNIFICANT CHANGE UP (ref 10.3–14.5)
SODIUM SERPL-SCNC: 138 MMOL/L — SIGNIFICANT CHANGE UP (ref 135–145)
VANCOMYCIN TROUGH SERPL-MCNC: <4 UG/ML — LOW (ref 10–20)
WBC # BLD: 6.22 K/UL — SIGNIFICANT CHANGE UP (ref 3.8–10.5)
WBC # FLD AUTO: 6.22 K/UL — SIGNIFICANT CHANGE UP (ref 3.8–10.5)

## 2019-07-01 PROCEDURE — 99233 SBSQ HOSP IP/OBS HIGH 50: CPT

## 2019-07-01 PROCEDURE — 99222 1ST HOSP IP/OBS MODERATE 55: CPT

## 2019-07-01 PROCEDURE — 72148 MRI LUMBAR SPINE W/O DYE: CPT | Mod: 26

## 2019-07-01 PROCEDURE — 99232 SBSQ HOSP IP/OBS MODERATE 35: CPT

## 2019-07-01 RX ORDER — HYDROMORPHONE HYDROCHLORIDE 2 MG/ML
1 INJECTION INTRAMUSCULAR; INTRAVENOUS; SUBCUTANEOUS EVERY 4 HOURS
Refills: 0 | Status: DISCONTINUED | OUTPATIENT
Start: 2019-07-01 | End: 2019-07-03

## 2019-07-01 RX ORDER — INSULIN GLARGINE 100 [IU]/ML
40 INJECTION, SOLUTION SUBCUTANEOUS AT BEDTIME
Refills: 0 | Status: DISCONTINUED | OUTPATIENT
Start: 2019-07-01 | End: 2019-07-03

## 2019-07-01 RX ORDER — GABAPENTIN 400 MG/1
300 CAPSULE ORAL THREE TIMES A DAY
Refills: 0 | Status: DISCONTINUED | OUTPATIENT
Start: 2019-07-01 | End: 2019-07-03

## 2019-07-01 RX ORDER — IBUPROFEN 200 MG
600 TABLET ORAL EVERY 8 HOURS
Refills: 0 | Status: COMPLETED | OUTPATIENT
Start: 2019-07-01 | End: 2019-07-03

## 2019-07-01 RX ORDER — SACCHAROMYCES BOULARDII 250 MG
250 POWDER IN PACKET (EA) ORAL
Refills: 0 | Status: DISCONTINUED | OUTPATIENT
Start: 2019-07-01 | End: 2019-07-03

## 2019-07-01 RX ADMIN — INSULIN GLARGINE 40 UNIT(S): 100 INJECTION, SOLUTION SUBCUTANEOUS at 23:18

## 2019-07-01 RX ADMIN — Medication 600 MILLIGRAM(S): at 23:17

## 2019-07-01 RX ADMIN — LIDOCAINE 1 PATCH: 4 CREAM TOPICAL at 01:38

## 2019-07-01 RX ADMIN — Medication 1 MILLIGRAM(S): at 07:43

## 2019-07-01 RX ADMIN — Medication 100 MILLIGRAM(S): at 14:44

## 2019-07-01 RX ADMIN — Medication 6: at 16:48

## 2019-07-01 RX ADMIN — Medication 4: at 08:59

## 2019-07-01 RX ADMIN — Medication 100 MILLIGRAM(S): at 23:16

## 2019-07-01 RX ADMIN — HYDROMORPHONE HYDROCHLORIDE 1 MILLIGRAM(S): 2 INJECTION INTRAMUSCULAR; INTRAVENOUS; SUBCUTANEOUS at 23:26

## 2019-07-01 RX ADMIN — Medication 8 UNIT(S): at 08:58

## 2019-07-01 RX ADMIN — HYDROMORPHONE HYDROCHLORIDE 1 MILLIGRAM(S): 2 INJECTION INTRAMUSCULAR; INTRAVENOUS; SUBCUTANEOUS at 15:53

## 2019-07-01 RX ADMIN — Medication 100 MILLIGRAM(S): at 06:05

## 2019-07-01 RX ADMIN — HYDROMORPHONE HYDROCHLORIDE 1 MILLIGRAM(S): 2 INJECTION INTRAMUSCULAR; INTRAVENOUS; SUBCUTANEOUS at 15:38

## 2019-07-01 RX ADMIN — GABAPENTIN 300 MILLIGRAM(S): 400 CAPSULE ORAL at 14:53

## 2019-07-01 RX ADMIN — Medication 250 MILLIGRAM(S): at 16:50

## 2019-07-01 RX ADMIN — GABAPENTIN 300 MILLIGRAM(S): 400 CAPSULE ORAL at 23:16

## 2019-07-01 RX ADMIN — Medication 100 MILLIGRAM(S): at 05:22

## 2019-07-01 RX ADMIN — Medication 600 MILLIGRAM(S): at 14:52

## 2019-07-01 RX ADMIN — Medication 600 MILLIGRAM(S): at 15:52

## 2019-07-01 RX ADMIN — Medication 100 MILLIGRAM(S): at 15:56

## 2019-07-01 RX ADMIN — LIDOCAINE 1 PATCH: 4 CREAM TOPICAL at 12:09

## 2019-07-01 RX ADMIN — Medication 100 MILLIGRAM(S): at 23:52

## 2019-07-01 RX ADMIN — MORPHINE SULFATE 4 MILLIGRAM(S): 50 CAPSULE, EXTENDED RELEASE ORAL at 06:11

## 2019-07-01 NOTE — CONSULT NOTE ADULT - SUBJECTIVE AND OBJECTIVE BOX
Patient is a 53y old  Female who presents with a chief complaint of neck abscess (30 Jun 2019 17:19)    HPI:  53F, active smoker, w/ neck abscess in the past, T2DM and obesity presents from home with a few day history of neck swelling and discomfort. Patient states she was previously treated for a neck abscess in a hospital in [facility]. Patient worked up in the er and found to have a neck abscess but refused bedside I and D. Patient seen at bedside and states feeling ok.       PAST MEDICAL & SURGICAL HISTORY:  Achilles tendon injury: right  Obesity (BMI 30-39.9)  H/O tear of meniscus of knee joint: left  ADHD (attention deficit hyperactivity disorder)  H/O colonoscopy  History of arthroscopy of left shoulder: 2013  H/O Achilles tendon repair: right, 2001      SH: lives at home. no EtOH. active smoking    FAMILY HISTORY:  Family history of breast cancer (Grandparent)  Family history of diabetes mellitus (Sibling)        Allergies    No Known Allergies      REVIEW OF SYSTEMS:    CONSTITUTIONAL: No fever, weight loss, or fatigue  EYES: No eye pain, visual disturbances, or discharge  ENMT:  No difficulty hearing, tinnitus, vertigo; No sinus or throat pain  NECK: No pain or stiffness  RESPIRATORY: No cough, wheezing, chills or hemoptysis; No shortness of breath  CARDIOVASCULAR: No chest pain, palpitations, dizziness, or leg swelling  GASTROINTESTINAL: No abdominal or epigastric pain. No nausea, vomiting, or hematemesis; No diarrhea or constipation. No melena or hematochezia.  NEUROLOGICAL: No headaches, memory loss, loss of strength, numbness, or tremors  SKIN: No itching, burning, rashes, or lesions   MUSCULOSKELETAL: No joint pain or swelling; No muscle, back, or extremity pain  PSYCHIATRIC: No depression, anxiety, mood swings, or difficulty sleeping        MEDICATIONS  (STANDING):  ceFAZolin   IVPB 2000 milliGRAM(s) IV Intermittent every 8 hours  clindamycin IVPB 600 milliGRAM(s) IV Intermittent every 8 hours  dextrose 5%. 1000 milliLiter(s) (50 mL/Hr) IV Continuous <Continuous>  dextrose 50% Injectable 12.5 Gram(s) IV Push once  dextrose 50% Injectable 25 Gram(s) IV Push once  dextrose 50% Injectable 25 Gram(s) IV Push once  gabapentin 300 milliGRAM(s) Oral three times a day  ibuprofen  Tablet. 600 milliGRAM(s) Oral every 8 hours  insulin glargine Injectable (LANTUS) 40 Unit(s) SubCutaneous at bedtime  insulin lispro (HumaLOG) corrective regimen sliding scale   SubCutaneous three times a day before meals  insulin lispro Injectable (HumaLOG) 8 Unit(s) SubCutaneous three times a day before meals  lidocaine   Patch 1 Patch Transdermal daily  saccharomyces boulardii 250 milliGRAM(s) Oral two times a day    MEDICATIONS  (PRN):  acetaminophen   Tablet .. 650 milliGRAM(s) Oral every 6 hours PRN Temp greater or equal to 38C (100.4F), Mild Pain (1 - 3)  cyclobenzaprine 5 milliGRAM(s) Oral three times a day PRN Muscle Spasm  dextrose 40% Gel 15 Gram(s) Oral once PRN Blood Glucose LESS THAN 70 milliGRAM(s)/deciliter  glucagon  Injectable 1 milliGRAM(s) IntraMuscular once PRN Glucose LESS THAN 70 milligrams/deciliter  LORazepam   Injectable 1 milliGRAM(s) IV Push once PRN mri  morphine  - Injectable 2 milliGRAM(s) IV Push every 4 hours PRN Moderate Pain (4 - 6)  morphine  - Injectable 4 milliGRAM(s) IV Push every 4 hours PRN Severe Pain (7 - 10)      Vital Signs Last 24 Hrs  T(C): 36.8 (01 Jul 2019 10:31), Max: 37.1 (30 Jun 2019 23:07)  T(F): 98.3 (01 Jul 2019 10:31), Max: 98.8 (30 Jun 2019 23:07)  HR: 73 (01 Jul 2019 10:31) (73 - 91)  BP: 148/93 (01 Jul 2019 10:31) (117/63 - 148/93)  BP(mean): --  RR: 18 (01 Jul 2019 10:31) (18 - 18)  SpO2: 98% (01 Jul 2019 10:31) (93% - 98%)      PHYSICAL EXAM:    Constitutional: NAD, obese  HEENT: EOMI, no exophalmos  Neck: +neck mass, no thyroid enlargement  Respiratory: CTAB  Cardiovascular: S1 and S2, RRR  Gastrointestinal: BS+, soft, ntnd  Extremities: No peripheral edema  Neurological: A/O x 3, no focal deficits  Psychiatric: Normal mood, normal affect  Skin: No rashes, no acanthosis        LABS  07-01    138  |  102  |  12.0  ----------------------------<  207<H>  4.2   |  24.0  |  0.82    Ca    9.1      01 Jul 2019 07:09  Mg     2.1     07-01    TPro  6.8  /  Alb  3.7  /  TBili  0.3<L>  /  DBili  x   /  AST  16  /  ALT  23  /  AlkPhos  90  07-01                          14.1   6.22  )-----------( 263      ( 01 Jul 2019 07:09 )             43.1       Hemoglobin A1C, Whole Blood: 11.1 % (07-01 @ 07:09)      Aspartate Aminotransferase (AST/SGOT): 16 U/L (07-01-19 @ 07:09)  Alkaline Phosphatase, Serum: 90 U/L (07-01-19 @ 07:09)  Alanine Aminotransferase (ALT/SGPT): 23 U/L (07-01-19 @ 07:09)  Albumin, Serum: 3.7 g/dL (07-01-19 @ 07:09)  Aspartate Aminotransferase (AST/SGOT): 22 U/L (06-29-19 @ 22:28)  Alkaline Phosphatase, Serum: 98 U/L (06-29-19 @ 22:28)  Albumin, Serum: 4.1 g/dL (06-29-19 @ 22:28)  Alanine Aminotransferase (ALT/SGPT): 30 U/L (06-29-19 @ 22:28)      CAPILLARY BLOOD GLUCOSE      POCT Blood Glucose.: 74 mg/dL (01 Jul 2019 12:05)  POCT Blood Glucose.: 82 mg/dL (01 Jul 2019 12:04)  POCT Blood Glucose.: 213 mg/dL (01 Jul 2019 08:54)  POCT Blood Glucose.: 269 mg/dL (30 Jun 2019 22:39)  POCT Blood Glucose.: 180 mg/dL (30 Jun 2019 16:48) Patient is a 53y old  Female who presents with a chief complaint of neck abscess (30 Jun 2019 17:19)    HPI:  53F, active smoker, w/ neck abscess in the past, T2DM, HTN, HLD and obesity presents from home with a few day history of neck swelling and discomfort. Patient states she was previously treated for a neck abscess in a hospital in [facility]. Patient worked up in the er and found to have a neck abscess but refused bedside I and D. Patient seen at bedside and states feeling ok.   sees endocrinologist at [facility]  has had diabetes for last 2 years  meds: metformin 500mg 1 tab daily, glimepiride 4mg BID, atorvastatin, lisinopril, basaglar 100 units, humalog 15 units TID  denies any complications  does not check FS very oten  has calluses and sees podiatrist  changed eating habits last month  FH of diabetes in mom, brother, nieces    PAST MEDICAL & SURGICAL HISTORY:  Achilles tendon injury: right  Obesity (BMI 30-39.9)  H/O tear of meniscus of knee joint: left  ADHD (attention deficit hyperactivity disorder)  H/O colonoscopy  History of arthroscopy of left shoulder: 2013  H/O Achilles tendon repair: right, 2001      SH: lives at home. no EtOH. active smoking    FAMILY HISTORY:  Family history of breast cancer (Grandparent)  Family history of diabetes mellitus (Sibling)        Allergies    No Known Allergies      REVIEW OF SYSTEMS:    CONSTITUTIONAL: No fever, weight loss, or fatigue  EYES: No eye pain, visual disturbances, or discharge  ENMT:  No difficulty hearing, tinnitus, vertigo; No sinus or throat pain  NECK: No pain or stiffness  RESPIRATORY: No cough, wheezing, chills or hemoptysis; No shortness of breath  CARDIOVASCULAR: No chest pain, palpitations, dizziness, or leg swelling  GASTROINTESTINAL: No abdominal or epigastric pain. No nausea, vomiting, or hematemesis; No diarrhea or constipation. No melena or hematochezia.  NEUROLOGICAL: No headaches, memory loss, loss of strength, numbness, or tremors  SKIN: No itching, burning, rashes, or lesions   MUSCULOSKELETAL: No joint pain or swelling; No muscle, back, or extremity pain  PSYCHIATRIC: No depression, anxiety, mood swings, or difficulty sleeping        MEDICATIONS  (STANDING):  ceFAZolin   IVPB 2000 milliGRAM(s) IV Intermittent every 8 hours  clindamycin IVPB 600 milliGRAM(s) IV Intermittent every 8 hours  dextrose 5%. 1000 milliLiter(s) (50 mL/Hr) IV Continuous <Continuous>  dextrose 50% Injectable 12.5 Gram(s) IV Push once  dextrose 50% Injectable 25 Gram(s) IV Push once  dextrose 50% Injectable 25 Gram(s) IV Push once  gabapentin 300 milliGRAM(s) Oral three times a day  ibuprofen  Tablet. 600 milliGRAM(s) Oral every 8 hours  insulin glargine Injectable (LANTUS) 40 Unit(s) SubCutaneous at bedtime  insulin lispro (HumaLOG) corrective regimen sliding scale   SubCutaneous three times a day before meals  insulin lispro Injectable (HumaLOG) 8 Unit(s) SubCutaneous three times a day before meals  lidocaine   Patch 1 Patch Transdermal daily  saccharomyces boulardii 250 milliGRAM(s) Oral two times a day    MEDICATIONS  (PRN):  acetaminophen   Tablet .. 650 milliGRAM(s) Oral every 6 hours PRN Temp greater or equal to 38C (100.4F), Mild Pain (1 - 3)  cyclobenzaprine 5 milliGRAM(s) Oral three times a day PRN Muscle Spasm  dextrose 40% Gel 15 Gram(s) Oral once PRN Blood Glucose LESS THAN 70 milliGRAM(s)/deciliter  glucagon  Injectable 1 milliGRAM(s) IntraMuscular once PRN Glucose LESS THAN 70 milligrams/deciliter  LORazepam   Injectable 1 milliGRAM(s) IV Push once PRN mri  morphine  - Injectable 2 milliGRAM(s) IV Push every 4 hours PRN Moderate Pain (4 - 6)  morphine  - Injectable 4 milliGRAM(s) IV Push every 4 hours PRN Severe Pain (7 - 10)      Vital Signs Last 24 Hrs  T(C): 36.8 (01 Jul 2019 10:31), Max: 37.1 (30 Jun 2019 23:07)  T(F): 98.3 (01 Jul 2019 10:31), Max: 98.8 (30 Jun 2019 23:07)  HR: 73 (01 Jul 2019 10:31) (73 - 91)  BP: 148/93 (01 Jul 2019 10:31) (117/63 - 148/93)  BP(mean): --  RR: 18 (01 Jul 2019 10:31) (18 - 18)  SpO2: 98% (01 Jul 2019 10:31) (93% - 98%)      PHYSICAL EXAM:    Constitutional: NAD, obese  HEENT: EOMI, no exophalmos  Neck: palpable posterior neck mass w/ purulent discharge, no thyroid enlargement  Respiratory: CTAB  Cardiovascular: S1 and S2, RRR  Gastrointestinal: BS+, soft, ntnd  Extremities: No peripheral edema  Neurological: A/O x 3, no focal deficits  Psychiatric: Normal mood, normal affect  Skin: No rashes, no acanthosis        LABS  07-01    138  |  102  |  12.0  ----------------------------<  207<H>  4.2   |  24.0  |  0.82    Ca    9.1      01 Jul 2019 07:09  Mg     2.1     07-01    TPro  6.8  /  Alb  3.7  /  TBili  0.3<L>  /  DBili  x   /  AST  16  /  ALT  23  /  AlkPhos  90  07-01                          14.1   6.22  )-----------( 263      ( 01 Jul 2019 07:09 )             43.1       Hemoglobin A1C, Whole Blood: 11.1 % (07-01 @ 07:09)      Aspartate Aminotransferase (AST/SGOT): 16 U/L (07-01-19 @ 07:09)  Alkaline Phosphatase, Serum: 90 U/L (07-01-19 @ 07:09)  Alanine Aminotransferase (ALT/SGPT): 23 U/L (07-01-19 @ 07:09)  Albumin, Serum: 3.7 g/dL (07-01-19 @ 07:09)  Aspartate Aminotransferase (AST/SGOT): 22 U/L (06-29-19 @ 22:28)  Alkaline Phosphatase, Serum: 98 U/L (06-29-19 @ 22:28)  Albumin, Serum: 4.1 g/dL (06-29-19 @ 22:28)  Alanine Aminotransferase (ALT/SGPT): 30 U/L (06-29-19 @ 22:28)      CAPILLARY BLOOD GLUCOSE      POCT Blood Glucose.: 74 mg/dL (01 Jul 2019 12:05)  POCT Blood Glucose.: 82 mg/dL (01 Jul 2019 12:04)  POCT Blood Glucose.: 213 mg/dL (01 Jul 2019 08:54)  POCT Blood Glucose.: 269 mg/dL (30 Jun 2019 22:39)  POCT Blood Glucose.: 180 mg/dL (30 Jun 2019 16:48)

## 2019-07-01 NOTE — CONSULT NOTE ADULT - ASSESSMENT
53F, active smoker, w/ neck abscess in the past, T2DM and obesity presents from home with a few day history of neck swelling and discomfort. Patient states she was previously treated for a neck abscess in a hospital in [facility]. Patient worked up in the er and found to have a neck abscess but refused bedside I and D. Patient seen at bedside and states feeling ok.     T2DM- FS well controlled   -A1c 11.1  -continue lantus 40 units  -continue humalog 8 units TID  -continue sliding scale  -should see nutritionist    Neck mass- being followed by ID. patient is an active smoker, at risk for malignancy    Obesity- needs to work on diet and exercise. 53F, active smoker, w/ neck abscess in the past, T2DM, HTN, HLD and obesity presents from home with a few day history of neck swelling and discomfort. Patient states she was previously treated for a neck abscess in a hospital in [facility]. Patient worked up in the er and found to have a neck abscess but refused bedside I and D. Patient seen at bedside and states feeling ok.     T2DM- FS well controlled   -A1c 11.1  -decrease to lantus 30 units  -continue humalog 8 units TID  -continue sliding scale  -should see nutritionist    HLD- on statin, would restart    HTN- above goal. on lisinopril at home. suggest restarting    Neck mass- being followed by ID. patient is an active smoker, at risk for malignancy

## 2019-07-02 LAB
ALBUMIN SERPL ELPH-MCNC: 3.5 G/DL — SIGNIFICANT CHANGE UP (ref 3.3–5.2)
ALP SERPL-CCNC: 86 U/L — SIGNIFICANT CHANGE UP (ref 40–120)
ALT FLD-CCNC: 22 U/L — SIGNIFICANT CHANGE UP
AMPHET UR-MCNC: NEGATIVE — SIGNIFICANT CHANGE UP
ANION GAP SERPL CALC-SCNC: 10 MMOL/L — SIGNIFICANT CHANGE UP (ref 5–17)
AST SERPL-CCNC: 18 U/L — SIGNIFICANT CHANGE UP
BARBITURATES UR SCN-MCNC: NEGATIVE — SIGNIFICANT CHANGE UP
BENZODIAZ UR-MCNC: NEGATIVE — SIGNIFICANT CHANGE UP
BILIRUB SERPL-MCNC: <0.2 MG/DL — LOW (ref 0.4–2)
BUN SERPL-MCNC: 11 MG/DL — SIGNIFICANT CHANGE UP (ref 8–20)
CALCIUM SERPL-MCNC: 9.4 MG/DL — SIGNIFICANT CHANGE UP (ref 8.6–10.2)
CHLORIDE SERPL-SCNC: 103 MMOL/L — SIGNIFICANT CHANGE UP (ref 98–107)
CO2 SERPL-SCNC: 25 MMOL/L — SIGNIFICANT CHANGE UP (ref 22–29)
COCAINE METAB.OTHER UR-MCNC: NEGATIVE — SIGNIFICANT CHANGE UP
CREAT SERPL-MCNC: 0.87 MG/DL — SIGNIFICANT CHANGE UP (ref 0.5–1.3)
GLUCOSE BLDC GLUCOMTR-MCNC: 140 MG/DL — HIGH (ref 70–99)
GLUCOSE BLDC GLUCOMTR-MCNC: 181 MG/DL — HIGH (ref 70–99)
GLUCOSE BLDC GLUCOMTR-MCNC: 202 MG/DL — HIGH (ref 70–99)
GLUCOSE BLDC GLUCOMTR-MCNC: 271 MG/DL — HIGH (ref 70–99)
GLUCOSE BLDC GLUCOMTR-MCNC: 285 MG/DL — HIGH (ref 70–99)
GLUCOSE SERPL-MCNC: 249 MG/DL — HIGH (ref 70–115)
HCT VFR BLD CALC: 42.1 % — SIGNIFICANT CHANGE UP (ref 34.5–45)
HGB BLD-MCNC: 13.7 G/DL — SIGNIFICANT CHANGE UP (ref 11.5–15.5)
MAGNESIUM SERPL-MCNC: 2.2 MG/DL — SIGNIFICANT CHANGE UP (ref 1.8–2.6)
MCHC RBC-ENTMCNC: 30.7 PG — SIGNIFICANT CHANGE UP (ref 27–34)
MCHC RBC-ENTMCNC: 32.5 GM/DL — SIGNIFICANT CHANGE UP (ref 32–36)
MCV RBC AUTO: 94.4 FL — SIGNIFICANT CHANGE UP (ref 80–100)
METHADONE UR-MCNC: NEGATIVE — SIGNIFICANT CHANGE UP
OPIATES UR-MCNC: POSITIVE
PCP SPEC-MCNC: SIGNIFICANT CHANGE UP
PCP UR-MCNC: NEGATIVE — SIGNIFICANT CHANGE UP
PLATELET # BLD AUTO: 285 K/UL — SIGNIFICANT CHANGE UP (ref 150–400)
POTASSIUM SERPL-MCNC: 4.7 MMOL/L — SIGNIFICANT CHANGE UP (ref 3.5–5.3)
POTASSIUM SERPL-SCNC: 4.7 MMOL/L — SIGNIFICANT CHANGE UP (ref 3.5–5.3)
PROT SERPL-MCNC: 6.5 G/DL — LOW (ref 6.6–8.7)
RBC # BLD: 4.46 M/UL — SIGNIFICANT CHANGE UP (ref 3.8–5.2)
RBC # FLD: 13.6 % — SIGNIFICANT CHANGE UP (ref 10.3–14.5)
SODIUM SERPL-SCNC: 138 MMOL/L — SIGNIFICANT CHANGE UP (ref 135–145)
THC UR QL: NEGATIVE — SIGNIFICANT CHANGE UP
WBC # BLD: 5.15 K/UL — SIGNIFICANT CHANGE UP (ref 3.8–10.5)
WBC # FLD AUTO: 5.15 K/UL — SIGNIFICANT CHANGE UP (ref 3.8–10.5)

## 2019-07-02 PROCEDURE — 99231 SBSQ HOSP IP/OBS SF/LOW 25: CPT

## 2019-07-02 PROCEDURE — 99232 SBSQ HOSP IP/OBS MODERATE 35: CPT

## 2019-07-02 PROCEDURE — 99233 SBSQ HOSP IP/OBS HIGH 50: CPT

## 2019-07-02 RX ORDER — ATORVASTATIN CALCIUM 80 MG/1
20 TABLET, FILM COATED ORAL AT BEDTIME
Refills: 0 | Status: DISCONTINUED | OUTPATIENT
Start: 2019-07-02 | End: 2019-07-03

## 2019-07-02 RX ORDER — INSULIN LISPRO 100/ML
10 VIAL (ML) SUBCUTANEOUS
Refills: 0 | Status: DISCONTINUED | OUTPATIENT
Start: 2019-07-02 | End: 2019-07-03

## 2019-07-02 RX ORDER — INSULIN LISPRO 100/ML
5 VIAL (ML) SUBCUTANEOUS
Refills: 0 | Status: DISCONTINUED | OUTPATIENT
Start: 2019-07-02 | End: 2019-07-02

## 2019-07-02 RX ORDER — LISINOPRIL 2.5 MG/1
10 TABLET ORAL DAILY
Refills: 0 | Status: DISCONTINUED | OUTPATIENT
Start: 2019-07-02 | End: 2019-07-03

## 2019-07-02 RX ORDER — DOCUSATE SODIUM 100 MG
100 CAPSULE ORAL
Refills: 0 | Status: DISCONTINUED | OUTPATIENT
Start: 2019-07-02 | End: 2019-07-03

## 2019-07-02 RX ORDER — SENNA PLUS 8.6 MG/1
2 TABLET ORAL AT BEDTIME
Refills: 0 | Status: DISCONTINUED | OUTPATIENT
Start: 2019-07-02 | End: 2019-07-03

## 2019-07-02 RX ADMIN — HYDROMORPHONE HYDROCHLORIDE 1 MILLIGRAM(S): 2 INJECTION INTRAMUSCULAR; INTRAVENOUS; SUBCUTANEOUS at 21:45

## 2019-07-02 RX ADMIN — HYDROMORPHONE HYDROCHLORIDE 1 MILLIGRAM(S): 2 INJECTION INTRAMUSCULAR; INTRAVENOUS; SUBCUTANEOUS at 16:31

## 2019-07-02 RX ADMIN — Medication 100 MILLIGRAM(S): at 05:39

## 2019-07-02 RX ADMIN — ATORVASTATIN CALCIUM 20 MILLIGRAM(S): 80 TABLET, FILM COATED ORAL at 21:00

## 2019-07-02 RX ADMIN — Medication 4: at 11:28

## 2019-07-02 RX ADMIN — LIDOCAINE 1 PATCH: 4 CREAM TOPICAL at 16:08

## 2019-07-02 RX ADMIN — HYDROMORPHONE HYDROCHLORIDE 1 MILLIGRAM(S): 2 INJECTION INTRAMUSCULAR; INTRAVENOUS; SUBCUTANEOUS at 13:26

## 2019-07-02 RX ADMIN — HYDROMORPHONE HYDROCHLORIDE 1 MILLIGRAM(S): 2 INJECTION INTRAMUSCULAR; INTRAVENOUS; SUBCUTANEOUS at 08:04

## 2019-07-02 RX ADMIN — Medication 100 MILLIGRAM(S): at 14:46

## 2019-07-02 RX ADMIN — Medication 100 MILLIGRAM(S): at 21:00

## 2019-07-02 RX ADMIN — Medication 5 UNIT(S): at 11:27

## 2019-07-02 RX ADMIN — HYDROMORPHONE HYDROCHLORIDE 1 MILLIGRAM(S): 2 INJECTION INTRAMUSCULAR; INTRAVENOUS; SUBCUTANEOUS at 12:15

## 2019-07-02 RX ADMIN — HYDROMORPHONE HYDROCHLORIDE 1 MILLIGRAM(S): 2 INJECTION INTRAMUSCULAR; INTRAVENOUS; SUBCUTANEOUS at 21:50

## 2019-07-02 RX ADMIN — GABAPENTIN 300 MILLIGRAM(S): 400 CAPSULE ORAL at 14:46

## 2019-07-02 RX ADMIN — SENNA PLUS 2 TABLET(S): 8.6 TABLET ORAL at 21:00

## 2019-07-02 RX ADMIN — GABAPENTIN 300 MILLIGRAM(S): 400 CAPSULE ORAL at 05:40

## 2019-07-02 RX ADMIN — Medication 250 MILLIGRAM(S): at 05:40

## 2019-07-02 RX ADMIN — Medication 600 MILLIGRAM(S): at 22:18

## 2019-07-02 RX ADMIN — GABAPENTIN 300 MILLIGRAM(S): 400 CAPSULE ORAL at 21:00

## 2019-07-02 RX ADMIN — HYDROMORPHONE HYDROCHLORIDE 1 MILLIGRAM(S): 2 INJECTION INTRAMUSCULAR; INTRAVENOUS; SUBCUTANEOUS at 09:15

## 2019-07-02 RX ADMIN — Medication 600 MILLIGRAM(S): at 15:04

## 2019-07-02 RX ADMIN — LISINOPRIL 10 MILLIGRAM(S): 2.5 TABLET ORAL at 11:25

## 2019-07-02 RX ADMIN — Medication 250 MILLIGRAM(S): at 16:31

## 2019-07-02 RX ADMIN — LIDOCAINE 1 PATCH: 4 CREAM TOPICAL at 11:25

## 2019-07-02 RX ADMIN — Medication 100 MILLIGRAM(S): at 21:01

## 2019-07-02 RX ADMIN — Medication 10 UNIT(S): at 16:31

## 2019-07-02 RX ADMIN — Medication 600 MILLIGRAM(S): at 21:45

## 2019-07-02 RX ADMIN — Medication 600 MILLIGRAM(S): at 00:00

## 2019-07-02 RX ADMIN — Medication 600 MILLIGRAM(S): at 05:40

## 2019-07-02 RX ADMIN — Medication 600 MILLIGRAM(S): at 14:45

## 2019-07-02 RX ADMIN — Medication 6: at 08:08

## 2019-07-02 RX ADMIN — INSULIN GLARGINE 40 UNIT(S): 100 INJECTION, SOLUTION SUBCUTANEOUS at 22:09

## 2019-07-03 ENCOUNTER — TRANSCRIPTION ENCOUNTER (OUTPATIENT)
Age: 54
End: 2019-07-03

## 2019-07-03 VITALS
RESPIRATION RATE: 18 BRPM | HEART RATE: 75 BPM | TEMPERATURE: 98 F | SYSTOLIC BLOOD PRESSURE: 122 MMHG | OXYGEN SATURATION: 99 % | DIASTOLIC BLOOD PRESSURE: 81 MMHG

## 2019-07-03 DIAGNOSIS — M51.26 OTHER INTERVERTEBRAL DISC DISPLACEMENT, LUMBAR REGION: ICD-10-CM

## 2019-07-03 LAB
GLUCOSE BLDC GLUCOMTR-MCNC: 127 MG/DL — HIGH (ref 70–99)
GLUCOSE BLDC GLUCOMTR-MCNC: 206 MG/DL — HIGH (ref 70–99)
HCT VFR BLD CALC: 40.7 % — SIGNIFICANT CHANGE UP (ref 34.5–45)
HGB BLD-MCNC: 13.4 G/DL — SIGNIFICANT CHANGE UP (ref 11.5–15.5)
MCHC RBC-ENTMCNC: 31 PG — SIGNIFICANT CHANGE UP (ref 27–34)
MCHC RBC-ENTMCNC: 32.9 GM/DL — SIGNIFICANT CHANGE UP (ref 32–36)
MCV RBC AUTO: 94.2 FL — SIGNIFICANT CHANGE UP (ref 80–100)
PLATELET # BLD AUTO: 291 K/UL — SIGNIFICANT CHANGE UP (ref 150–400)
RBC # BLD: 4.32 M/UL — SIGNIFICANT CHANGE UP (ref 3.8–5.2)
RBC # FLD: 13.8 % — SIGNIFICANT CHANGE UP (ref 10.3–14.5)
WBC # BLD: 5.41 K/UL — SIGNIFICANT CHANGE UP (ref 3.8–10.5)
WBC # FLD AUTO: 5.41 K/UL — SIGNIFICANT CHANGE UP (ref 3.8–10.5)

## 2019-07-03 PROCEDURE — 99232 SBSQ HOSP IP/OBS MODERATE 35: CPT

## 2019-07-03 PROCEDURE — 86140 C-REACTIVE PROTEIN: CPT

## 2019-07-03 PROCEDURE — 83735 ASSAY OF MAGNESIUM: CPT

## 2019-07-03 PROCEDURE — 80202 ASSAY OF VANCOMYCIN: CPT

## 2019-07-03 PROCEDURE — 99285 EMERGENCY DEPT VISIT HI MDM: CPT | Mod: 25

## 2019-07-03 PROCEDURE — 82962 GLUCOSE BLOOD TEST: CPT

## 2019-07-03 PROCEDURE — 84145 PROCALCITONIN (PCT): CPT

## 2019-07-03 PROCEDURE — 85027 COMPLETE CBC AUTOMATED: CPT

## 2019-07-03 PROCEDURE — 96374 THER/PROPH/DIAG INJ IV PUSH: CPT | Mod: XU

## 2019-07-03 PROCEDURE — 80074 ACUTE HEPATITIS PANEL: CPT

## 2019-07-03 PROCEDURE — 80053 COMPREHEN METABOLIC PANEL: CPT

## 2019-07-03 PROCEDURE — 99239 HOSP IP/OBS DSCHRG MGMT >30: CPT

## 2019-07-03 PROCEDURE — 83605 ASSAY OF LACTIC ACID: CPT

## 2019-07-03 PROCEDURE — 80307 DRUG TEST PRSMV CHEM ANLYZR: CPT

## 2019-07-03 PROCEDURE — 82009 KETONE BODYS QUAL: CPT

## 2019-07-03 PROCEDURE — 96375 TX/PRO/DX INJ NEW DRUG ADDON: CPT | Mod: XU

## 2019-07-03 PROCEDURE — 70491 CT SOFT TISSUE NECK W/DYE: CPT

## 2019-07-03 PROCEDURE — 84702 CHORIONIC GONADOTROPIN TEST: CPT

## 2019-07-03 PROCEDURE — 36415 COLL VENOUS BLD VENIPUNCTURE: CPT

## 2019-07-03 PROCEDURE — 87040 BLOOD CULTURE FOR BACTERIA: CPT

## 2019-07-03 PROCEDURE — 83036 HEMOGLOBIN GLYCOSYLATED A1C: CPT

## 2019-07-03 PROCEDURE — 72148 MRI LUMBAR SPINE W/O DYE: CPT

## 2019-07-03 PROCEDURE — 83615 LACTATE (LD) (LDH) ENZYME: CPT

## 2019-07-03 RX ORDER — CEPHALEXIN 500 MG
1 CAPSULE ORAL
Qty: 16 | Refills: 0
Start: 2019-07-03 | End: 2019-07-06

## 2019-07-03 RX ORDER — GABAPENTIN 400 MG/1
1 CAPSULE ORAL
Qty: 0 | Refills: 0 | DISCHARGE
Start: 2019-07-03

## 2019-07-03 RX ORDER — HYDROMORPHONE HYDROCHLORIDE 2 MG/ML
1 INJECTION INTRAMUSCULAR; INTRAVENOUS; SUBCUTANEOUS
Qty: 28 | Refills: 0
Start: 2019-07-03 | End: 2019-07-09

## 2019-07-03 RX ORDER — LISINOPRIL 2.5 MG/1
1 TABLET ORAL
Qty: 0 | Refills: 0 | DISCHARGE
Start: 2019-07-03

## 2019-07-03 RX ORDER — CEPHALEXIN 500 MG
500 CAPSULE ORAL
Refills: 0 | Status: DISCONTINUED | OUTPATIENT
Start: 2019-07-03 | End: 2019-07-03

## 2019-07-03 RX ADMIN — Medication 500 MILLIGRAM(S): at 12:46

## 2019-07-03 RX ADMIN — Medication 10 UNIT(S): at 07:58

## 2019-07-03 RX ADMIN — Medication 600 MILLIGRAM(S): at 05:23

## 2019-07-03 RX ADMIN — Medication 100 MILLIGRAM(S): at 05:23

## 2019-07-03 RX ADMIN — Medication 4: at 07:58

## 2019-07-03 RX ADMIN — LIDOCAINE 1 PATCH: 4 CREAM TOPICAL at 11:47

## 2019-07-03 RX ADMIN — HYDROMORPHONE HYDROCHLORIDE 1 MILLIGRAM(S): 2 INJECTION INTRAMUSCULAR; INTRAVENOUS; SUBCUTANEOUS at 10:30

## 2019-07-03 RX ADMIN — GABAPENTIN 300 MILLIGRAM(S): 400 CAPSULE ORAL at 05:23

## 2019-07-03 RX ADMIN — GABAPENTIN 300 MILLIGRAM(S): 400 CAPSULE ORAL at 14:10

## 2019-07-03 RX ADMIN — HYDROMORPHONE HYDROCHLORIDE 1 MILLIGRAM(S): 2 INJECTION INTRAMUSCULAR; INTRAVENOUS; SUBCUTANEOUS at 09:57

## 2019-07-03 RX ADMIN — LISINOPRIL 10 MILLIGRAM(S): 2.5 TABLET ORAL at 05:23

## 2019-07-03 RX ADMIN — Medication 250 MILLIGRAM(S): at 05:23

## 2019-07-03 RX ADMIN — Medication 10 UNIT(S): at 11:46

## 2019-07-03 NOTE — DISCHARGE NOTE PROVIDER - CARE PROVIDERS DIRECT ADDRESSES
[email or web address],[email or web address],DirectAddress_Unknown

## 2019-07-03 NOTE — PROGRESS NOTE ADULT - ASSESSMENT
1) neck abscess - still refusing I and D  --> c.w iv abx  --> id and surgery following  --> follow up blood cultures    2) poorly controlled dm2 --> endocrine consult placed  --> c.w lantus and ssi     3) L5/S1 disc herniation --> outpatient ortho spine follow up  --> start atc motrin  --> start gabapentin     4) dvt prop --> scds
1) neck abscess -agreeable to I and D, surgery aware  --> c.w iv abx  --> id and surgery following  --> follow up blood cultures    2) poorly controlled dm2 --> endocrine following  --> c.w lantus and ssi   --> add premeal     3) L5/S1 disc herniation --> outpatient ortho spine follow up  --> cw pain meds    4) dvt prop --> scds    dispo --> possible dc next 24-48 hours
52y/o  Female with a h/o neck abscess, Uncontrolled DM type 2 on insulin,  Active smoker. Patient comes in from home with neck swelling and discomfort. Patient was in her usual state of health and started to develop the swelling a 3-4 days ago. Does not recall fever. No chills. In the ER patient was afebrile, no leukocytosis. Started on IV Vancomycin and Zosyn.       Neck Abscess  Uncontrolled DM type 2 on insulin      - Blood cultures No growth  - CT neck with abscess  - Surgery following, s/p I&D yesterday  - D/C Cefazolin   - D/C Clindamycin   - Start oral Keflex 500mg PO q 6 hours  till 7/7/19  - HbA1c is 11.1, endocrinology consult noted   - Trend Fever  - Trend Leukocytosis      Will Sign off. Please call PRN.
53 year old female with 2 distinct neck masses currently afebrile with normal WBC requiring incision and drainage (I&D). No acute indication for operating room incision and drainage at this time. ACS/Trauma recommends admission to medical team for IV ABx and reconsulting ACS/Trauma once patient is amenable to bedside I&D. Recommend warm compress for comfort.     -Continue IV abx  -Warm compresses  -Will reassess wound and determine whether it needs I&D today; Patient became agreeable after hours overnight  -Follow up BlCx  -Continue abx per ID (Cefax/clinda)  -Glucose control per Endocrine; Infection will not clear until glucoses are better controlled  -Will follow
53F, active smoker, w/ neck abscess in the past, T2DM, HTN, HLD and obesity presents from home with a few day history of neck swelling and discomfort. Patient states she was previously treated for a neck abscess in a hospital in [facility]. Patient worked up in the er and found to have a neck abscess but refused bedside I and D. Patient seen at bedside and states feeling ok.     T2DM- FS well controlled   -A1c 11.1  -continue lantus 40 units  -increase to humalog 10 units TID  -continue sliding scale  -should see nutritionist    HLD- on statin, would restart lipitor    HTN- on lisinopril    Neck mass- being followed by ID. patient is an active smoker
54y/o  Female with a h/o neck abscess, Uncontrolled DM type 2 on insulin,  Active smoker. Patient comes in from home with neck swelling and discomfort. Patient was in her usual state of health and started to develop the swelling a 3-4 days ago. Does not recall fever. No chills. In the ER patient was afebrile, no leukocytosis. Started on IV Vancomycin and Zosyn.       Neck Abscess  Uncontrolled DM type 2 on insulin      - Blood cultures pending  - CT neck with abscess  - Surgery following  - Continue Cefazolin 2gm IV q 8hours  - Continue Clindamycin   - HbA1c is 11.1, endocrinology consult noted   - Trend Fever  - Trend Leukocytosis      Will Follow

## 2019-07-03 NOTE — DISCHARGE NOTE PROVIDER - NSDCCPCAREPLAN_GEN_ALL_CORE_FT
PRINCIPAL DISCHARGE DIAGNOSIS  Diagnosis: Neck abscess  Assessment and Plan of Treatment:       SECONDARY DISCHARGE DIAGNOSES  Diagnosis: Hyperglycemia  Assessment and Plan of Treatment:

## 2019-07-03 NOTE — CHART NOTE - NSCHARTNOTEFT_GEN_A_CORE
Patient was seen & examined at bedside with Dr. Hernandez. Overlying dressing & packing removed from neck incision - indurated however no fluctuance palpated, no purulent drainage, foul smelling discharge, or bleeding noted. Recommend patient showers daily allowing warm water to run over wound, pat dry, and apply dry gauze on top of incision. Wound does not need to be packed. Upon discharge patient should call and make a [facility], [phone]. Please re-consult, as needed. Thank you.

## 2019-07-03 NOTE — DISCHARGE NOTE PROVIDER - CARE PROVIDER_API CALL
Jamaal Perez (DO)  Orthopaedic Surgery  [address]  [place] [address]  Phone: [phone]  Fax: [phone]  Follow Up Time:     primary care,   Phone: (   )    -  Fax: (   )    -  Follow Up Time:     surgerty,   Upon discharge patient should call and make a follow-up appointment at the [facility] - [address] [place] [address], [phone]. Please re-consult, as needed. Thank you.  Phone: (   )    -  Fax: (   )    -  Follow Up Time:

## 2019-07-03 NOTE — DIETITIAN INITIAL EVALUATION ADULT. - OTHER INFO
Pt admitted for neck abscess. Pt with uncontrolled T2DM- A1C 11.1%. Today we discussed nutrition education to help manage better BG control. Pt states she has recently changed diet, eating less carbs, more veggies and lean meats. Checks BG 2x/day 250-350 (has come down from 400s regularly). Diet literature left at bedside. Pt with good comprehension.

## 2019-07-03 NOTE — DISCHARGE NOTE PROVIDER - PROVIDER TOKENS
PROVIDER:[TOKEN:[[id number]]],FREE:[LAST:[primary care],PHONE:[(   )    -],FAX:[(   )    -]],FREE:[LAST:[surgerty],PHONE:[(   )    -],FAX:[(   )    -],ADDRESS:[Upon discharge patient should call and make a follow-up appointment at the [facility] Surgery Clinic - [address] [place] [address], [phone]. Please re-consult, as needed. Thank you.]]

## 2019-07-03 NOTE — DISCHARGE NOTE NURSING/CASE MANAGEMENT/SOCIAL WORK - NSDCDPATPORTLINK_GEN_ALL_CORE
You can access the [facility] Patient Portal, offered by [facility], by registering with the following website: [email or web address]

## 2019-07-03 NOTE — PHYSICAL THERAPY INITIAL EVALUATION ADULT - PERTINENT HX OF CURRENT PROBLEM, REHAB EVAL
Pt with hx of herniated disc with radicular sx presents to [facility] with reports of worsening neck mass

## 2019-07-03 NOTE — PROGRESS NOTE ADULT - SUBJECTIVE AND OBJECTIVE BOX
JENNIFER CHRISTIE    [id number]    53y      Female    INTERVAL HPI/OVERNIGHT EVENTS:    patient being seen for neck abscess and poorly controlled dm2. patient seen at bedside and states neck pain is improved but still has back pain     REVIEW OF SYSTEMS:    CONSTITUTIONAL: back pain   RESPIRATORY: No cough, wheezing, hemoptysis; No shortness of breath  CARDIOVASCULAR: No chest pain, palpitations  GASTROINTESTINAL: No abdominal or epigastric pain. No nausea, vomiting  NEUROLOGICAL: No headaches, memory loss, loss of strength.  MISCELLANEOUS:      Vital Signs Last 24 Hrs  T(C): 36.8 (01 Jul 2019 10:31), Max: 37.1 (30 Jun 2019 23:07)  T(F): 98.3 (01 Jul 2019 10:31), Max: 98.8 (30 Jun 2019 23:07)  HR: 73 (01 Jul 2019 10:31) (73 - 91)  BP: 148/93 (01 Jul 2019 10:31) (117/63 - 148/93)  BP(mean): --  RR: 18 (01 Jul 2019 10:31) (18 - 18)  SpO2: 98% (01 Jul 2019 10:31) (93% - 98%)    PHYSICAL EXAM:    GENERAL: NAD, well-groomed  HEENT: PERRL, +EOMI  NECK: swelling, tender  CHEST/LUNG: Clear to auscultation bilaterally; No wheezing  HEART: S1S2+, Regular rate and rhythm; No murmurs, rubs, or gallops  ABDOMEN: Soft, Nontender, Nondistended; Bowel sounds present  EXTREMITIES:  2+ Peripheral Pulses, No clubbing, cyanosis, or edema  SKIN: No rashes or lesions  NEURO: AAOX3, no focal deficits, no motor r sensory loss  PSYCH: normal mood      LABS:                        14.1   6.22  )-----------( 263      ( 01 Jul 2019 07:09 )             43.1     07-01    138  |  102  |  12.0  ----------------------------<  207<H>  4.2   |  24.0  |  0.82    Ca    9.1      01 Jul 2019 07:09  Mg     2.1     07-01    TPro  6.8  /  Alb  3.7  /  TBili  0.3<L>  /  DBili  x   /  AST  16  /  ALT  23  /  AlkPhos  90  07-01            MEDICATIONS  (STANDING):  ceFAZolin   IVPB 2000 milliGRAM(s) IV Intermittent every 8 hours  clindamycin IVPB 600 milliGRAM(s) IV Intermittent every 8 hours  dextrose 5%. 1000 milliLiter(s) (50 mL/Hr) IV Continuous <Continuous>  dextrose 50% Injectable 12.5 Gram(s) IV Push once  dextrose 50% Injectable 25 Gram(s) IV Push once  dextrose 50% Injectable 25 Gram(s) IV Push once  insulin glargine Injectable (LANTUS) 40 Unit(s) SubCutaneous at bedtime  insulin lispro (HumaLOG) corrective regimen sliding scale   SubCutaneous three times a day before meals  insulin lispro Injectable (HumaLOG) 8 Unit(s) SubCutaneous three times a day before meals  lidocaine   Patch 1 Patch Transdermal daily  saccharomyces boulardii 250 milliGRAM(s) Oral two times a day    MEDICATIONS  (PRN):  acetaminophen   Tablet .. 650 milliGRAM(s) Oral every 6 hours PRN Temp greater or equal to 38C (100.4F), Mild Pain (1 - 3)  cyclobenzaprine 5 milliGRAM(s) Oral three times a day PRN Muscle Spasm  dextrose 40% Gel 15 Gram(s) Oral once PRN Blood Glucose LESS THAN 70 milliGRAM(s)/deciliter  glucagon  Injectable 1 milliGRAM(s) IntraMuscular once PRN Glucose LESS THAN 70 milligrams/deciliter  LORazepam   Injectable 1 milliGRAM(s) IV Push once PRN mri  morphine  - Injectable 2 milliGRAM(s) IV Push every 4 hours PRN Moderate Pain (4 - 6)  morphine  - Injectable 4 milliGRAM(s) IV Push every 4 hours PRN Severe Pain (7 - 10)      RADIOLOGY & ADDITIONAL TESTS:    mri lumbar spine - IMPRESSION: Broad-based disc herniation at L5/S1. No evidence of exiting   nerve root compression.
[facility]  INFECTIOUS DISEASES AND INTERNAL MEDICINE at [place]  =======================================================  Jeremiah Walsh MD  Diplomates American Board of Internal Medicine and Infectious Diseases  Tel: [phone]      Fax: [phone]  =======================================================    JENNIFER CHRISTIE [id number]    Follow up: Neck Abscess    No fever or chills  No diarrhea      Allergies:  No Known Allergies      Antibiotics:  ceFAZolin   IVPB 2000 milliGRAM(s) IV Intermittent every 8 hours  clindamycin IVPB 600 milliGRAM(s) IV Intermittent every 8 hours      REVIEW OF SYSTEMS:  CONSTITUTIONAL:  No Fever or chills  HEENT:  No diplopia or blurred vision.  No earache, sore throat or runny nose.  CARDIOVASCULAR:  No pressure, squeezing, strangling, tightness, heaviness or aching about the chest, neck, axilla or epigastrium.  RESPIRATORY:  No cough, shortness of breath  GASTROINTESTINAL:  No nausea, vomiting or diarrhea.  GENITOURINARY:  No dysuria, frequency or urgency.   MUSCULOSKELETAL:  no joint aches, no muscle pain  SKIN:  + Neck Abscess   NEUROLOGIC:  No Headaches, seizures or weakness.  PSYCHIATRIC:  No disorder of thought or mood.  ENDOCRINE:  No heat or cold intolerance  HEMATOLOGICAL:  No easy bruising or bleeding.       Physical Exam:  Vital Signs Last 24 Hrs  T(C): 36.4 (01 Jul 2019 15:07), Max: 37.1 (30 Jun 2019 23:07)  T(F): 97.5 (01 Jul 2019 15:07), Max: 98.8 (30 Jun 2019 23:07)  HR: 77 (01 Jul 2019 15:07) (73 - 91)  BP: 133/89 (01 Jul 2019 15:07) (117/63 - 148/93)  RR: 20 (01 Jul 2019 15:07) (18 - 20)  SpO2: 98% (01 Jul 2019 15:07) (93% - 98%)      GEN: NAD, pleasant  HEENT: normocephalic and atraumatic. EOMI. PERRL.  Anicteric  NECK: Supple.   LUNGS: Clear to auscultation.  HEART: Regular rate and rhythm   ABDOMEN: Soft, nontender, and nondistended.  Positive bowel sounds.    : No CVA tenderness  EXTREMITIES: Without any edema.  MSK: No joint swelling  NEUROLOGIC:  No Focal Deficits  PSYCHIATRIC: Appropriate affect .  SKIN: Neck Abscess      Labs:  07-01    138  |  102  |  12.0  ----------------------------<  207<H>  4.2   |  24.0  |  0.82    Ca    9.1      01 Jul 2019 07:09  Mg     2.1     07-01    TPro  6.8  /  Alb  3.7  /  TBili  0.3<L>  /  DBili  x   /  AST  16  /  ALT  23  /  AlkPhos  90  07-01                          14.1   6.22  )-----------( 263      ( 01 Jul 2019 07:09 )             43.1     LIVER FUNCTIONS - ( 01 Jul 2019 07:09 )  Alb: 3.7 g/dL / Pro: 6.8 g/dL / ALK PHOS: 90 U/L / ALT: 23 U/L / AST: 16 U/L / GGT: x             Hemoglobin A1C, Whole Blood in AM (07.01.19 @ 07:09)    Hemoglobin A1C, Whole Blood: 11.1 %
INTERVAL HPI/OVERNIGHT EVENTS:    SUBJECTIVE:  No acute changes. Overngiht patient agreed to allow us to I&D if necessary. Will assess on rounds  A1C 11.1, Endocrine on board and pt on lantus and humalog  ID on borad and changed abx from vanc/zosyn to Cefaz and Clinda    MEDICATIONS  (STANDING):  ceFAZolin   IVPB 2000 milliGRAM(s) IV Intermittent every 8 hours  clindamycin IVPB 600 milliGRAM(s) IV Intermittent every 8 hours  dextrose 5%. 1000 milliLiter(s) (50 mL/Hr) IV Continuous <Continuous>  dextrose 50% Injectable 12.5 Gram(s) IV Push once  dextrose 50% Injectable 25 Gram(s) IV Push once  dextrose 50% Injectable 25 Gram(s) IV Push once  gabapentin 300 milliGRAM(s) Oral three times a day  ibuprofen  Tablet. 600 milliGRAM(s) Oral every 8 hours  insulin glargine Injectable (LANTUS) 40 Unit(s) SubCutaneous at bedtime  insulin lispro (HumaLOG) corrective regimen sliding scale   SubCutaneous three times a day before meals  lidocaine   Patch 1 Patch Transdermal daily  saccharomyces boulardii 250 milliGRAM(s) Oral two times a day    MEDICATIONS  (PRN):  acetaminophen   Tablet .. 650 milliGRAM(s) Oral every 6 hours PRN Temp greater or equal to 38C (100.4F), Mild Pain (1 - 3)  cyclobenzaprine 5 milliGRAM(s) Oral three times a day PRN Muscle Spasm  dextrose 40% Gel 15 Gram(s) Oral once PRN Blood Glucose LESS THAN 70 milliGRAM(s)/deciliter  glucagon  Injectable 1 milliGRAM(s) IntraMuscular once PRN Glucose LESS THAN 70 milligrams/deciliter  HYDROmorphone  Injectable 1 milliGRAM(s) IV Push every 4 hours PRN Severe Pain (7 - 10)  LORazepam   Injectable 1 milliGRAM(s) IV Push once PRN mri  morphine  - Injectable 2 milliGRAM(s) IV Push every 4 hours PRN Moderate Pain (4 - 6)      Vital Signs Last 24 Hrs  T(C): 36.5 (01 Jul 2019 23:22), Max: 37.1 (01 Jul 2019 04:09)  T(F): 97.7 (01 Jul 2019 23:22), Max: 98.7 (01 Jul 2019 04:09)  HR: 76 (01 Jul 2019 23:22) (73 - 82)  BP: 123/80 (01 Jul 2019 23:22) (117/63 - 148/93)  BP(mean): --  RR: 20 (01 Jul 2019 23:22) (18 - 20)  SpO2: 95% (01 Jul 2019 23:22) (93% - 98%)    PE  Constitutional: NAD, obese  HEENT: EOMI, no exophalmos  Neck: Well healed surgical incision over posterior midline neck with palpable firm tender mass; tenderness also present in posterior right neck; minimal surrounding cellulitis noted   Respiratory: CTAB  Cardiovascular: S1 and S2, RRR  Gastrointestinal: BS+, soft, ntnd  Extremities: No peripheral edema  Neurological: A/O x 3, no focal deficits  Psychiatric: Normal mood, normal affect  Skin: No rashes, no acanthosis    I&O's Detail      LABS:                        14.1   6.22  )-----------( 263      ( 01 Jul 2019 07:09 )             43.1     07-01    138  |  102  |  12.0  ----------------------------<  207<H>  4.2   |  24.0  |  0.82    Ca    9.1      01 Jul 2019 07:09  Mg     2.1     07-01    TPro  6.8  /  Alb  3.7  /  TBili  0.3<L>  /  DBili  x   /  AST  16  /  ALT  23  /  AlkPhos  90  07-01          RADIOLOGY & ADDITIONAL STUDIES:
Interval Events:  no overnight events  follow up on type 2 diabetes    patient seen and examined at bedside.  complains of posterior neck pain  no other complaints    REVIEW OF SYSTEMS:    CONSTITUTIONAL: No fever, weight loss, or fatigue  EYES: No eye pain, visual disturbances, or discharge  ENMT:  No difficulty hearing, tinnitus, vertigo; No sinus or throat pain  NECK: No pain or stiffness  RESPIRATORY: No cough, wheezing, chills or hemoptysis; No shortness of breath  CARDIOVASCULAR: No chest pain, palpitations, dizziness, or leg swelling  GASTROINTESTINAL: No abdominal or epigastric pain. No nausea, vomiting, or hematemesis; No diarrhea or constipation. No melena or hematochezia.  NEUROLOGICAL: No headaches, memory loss, loss of strength, numbness, or tremors  SKIN: No itching, burning, rashes, or lesions   MUSCULOSKELETAL: No joint pain or swelling; No muscle, back, or extremity pain  PSYCHIATRIC: No depression, anxiety, mood swings, or difficulty sleeping        No Known Allergies      MEDICATIONS  (STANDING):  ceFAZolin   IVPB 2000 milliGRAM(s) IV Intermittent every 8 hours  clindamycin IVPB 600 milliGRAM(s) IV Intermittent every 8 hours  dextrose 5%. 1000 milliLiter(s) (50 mL/Hr) IV Continuous <Continuous>  dextrose 50% Injectable 12.5 Gram(s) IV Push once  dextrose 50% Injectable 25 Gram(s) IV Push once  dextrose 50% Injectable 25 Gram(s) IV Push once  gabapentin 300 milliGRAM(s) Oral three times a day  ibuprofen  Tablet. 600 milliGRAM(s) Oral every 8 hours  insulin glargine Injectable (LANTUS) 40 Unit(s) SubCutaneous at bedtime  insulin lispro (HumaLOG) corrective regimen sliding scale   SubCutaneous three times a day before meals  insulin lispro Injectable (HumaLOG) 5 Unit(s) SubCutaneous three times a day before meals  lidocaine   Patch 1 Patch Transdermal daily  lisinopril 10 milliGRAM(s) Oral daily  saccharomyces boulardii 250 milliGRAM(s) Oral two times a day    MEDICATIONS  (PRN):  acetaminophen   Tablet .. 650 milliGRAM(s) Oral every 6 hours PRN Temp greater or equal to 38C (100.4F), Mild Pain (1 - 3)  cyclobenzaprine 5 milliGRAM(s) Oral three times a day PRN Muscle Spasm  dextrose 40% Gel 15 Gram(s) Oral once PRN Blood Glucose LESS THAN 70 milliGRAM(s)/deciliter  glucagon  Injectable 1 milliGRAM(s) IntraMuscular once PRN Glucose LESS THAN 70 milligrams/deciliter  HYDROmorphone  Injectable 1 milliGRAM(s) IV Push every 4 hours PRN Severe Pain (7 - 10)  LORazepam   Injectable 1 milliGRAM(s) IV Push once PRN mri  morphine  - Injectable 2 milliGRAM(s) IV Push every 4 hours PRN Moderate Pain (4 - 6)      Vital Signs Last 24 Hrs  T(C): 36.4 (02 Jul 2019 11:26), Max: 36.6 (02 Jul 2019 03:59)  T(F): 97.6 (02 Jul 2019 11:26), Max: 97.9 (02 Jul 2019 03:59)  HR: 70 (02 Jul 2019 11:26) (70 - 82)  BP: 126/75 (02 Jul 2019 11:26) (123/80 - 141/89)  BP(mean): --  RR: 18 (02 Jul 2019 11:26) (18 - 20)  SpO2: 95% (02 Jul 2019 11:26) (92% - 98%)    PHYSICAL EXAM:    Constitutional: NAD, well-groomed, well-developed  HEENT: PERRLA, EOMI, no exophalmos  Neck: No LAD, No JVD, trachea midline, no thyroid enlargement  Respiratory: CTAB  Cardiovascular: S1 and S2, RRR, no M/G/R  Gastrointestinal: BS+, soft, no organomeglag or mass  Extremities: No peripheral edema, no pedal lesions  Vascular: 2+ peripheral pulses  Neurological: A/O x 3, no focal deficits  Psychiatric: Normal mood, normal affect  Skin: No rashes, no acanthosis        LABS  07-02    138  |  103  |  11.0  ----------------------------<  249<H>  4.7   |  25.0  |  0.87    Ca    9.4      02 Jul 2019 06:17  Mg     2.2     07-02    TPro  6.5<L>  /  Alb  3.5  /  TBili  <0.2<L>  /  DBili  x   /  AST  18  /  ALT  22  /  AlkPhos  86  07-02                          13.7   5.15  )-----------( 285      ( 02 Jul 2019 06:17 )             42.1       Hemoglobin A1C, Whole Blood: 11.1 % (07-01-19 @ 07:09)    Aspartate Aminotransferase (AST/SGOT): 18 U/L (07-02-19 @ 06:17)  Alkaline Phosphatase, Serum: 86 U/L (07-02-19 @ 06:17)  Alanine Aminotransferase (ALT/SGPT): 22 U/L (07-02-19 @ 06:17)  Albumin, Serum: 3.5 g/dL (07-02-19 @ 06:17)  Aspartate Aminotransferase (AST/SGOT): 16 U/L (07-01-19 @ 07:09)  Alkaline Phosphatase, Serum: 90 U/L (07-01-19 @ 07:09)  Alanine Aminotransferase (ALT/SGPT): 23 U/L (07-01-19 @ 07:09)  Albumin, Serum: 3.7 g/dL (07-01-19 @ 07:09)    CAPILLARY BLOOD GLUCOSE      POCT Blood Glucose.: 181 mg/dL (02 Jul 2019 11:56)  POCT Blood Glucose.: 202 mg/dL (02 Jul 2019 11:26)  POCT Blood Glucose.: 285 mg/dL (02 Jul 2019 08:07)  POCT Blood Glucose.: 317 mg/dL (01 Jul 2019 23:18)  POCT Blood Glucose.: 271 mg/dL (01 Jul 2019 16:43)
JENNIFER CHRISTIE    [id number]    53y      Female    INTERVAL HPI/OVERNIGHT EVENTS:    patient being seen for neck abscess and poorly controlled dm2.  patient seen at bedside and states pain is improved.     patient is now agreeable to I and D    REVIEW OF SYSTEMS:    CONSTITUTIONAL: pain   RESPIRATORY: No cough, wheezing, hemoptysis; No shortness of breath  CARDIOVASCULAR: No chest pain, palpitations  GASTROINTESTINAL: No abdominal or epigastric pain. No nausea, vomiting  NEUROLOGICAL: No headaches, memory loss, loss of strength.  MISCELLANEOUS:      Vital Signs Last 24 Hrs  T(C): 36.6 (02 Jul 2019 07:05), Max: 36.6 (02 Jul 2019 03:59)  T(F): 97.8 (02 Jul 2019 07:05), Max: 97.9 (02 Jul 2019 03:59)  HR: 82 (02 Jul 2019 07:05) (76 - 82)  BP: 141/89 (02 Jul 2019 07:05) (123/80 - 141/89)  BP(mean): --  RR: 19 (02 Jul 2019 07:05) (19 - 20)  SpO2: 96% (02 Jul 2019 07:05) (92% - 98%)    PHYSICAL EXAM:    GENERAL: NAD, well-groomed  HEENT: PERRL, +EOMI  NECK: tenderness improved  CHEST/LUNG: Clear to auscultation bilaterally; No wheezing  HEART: S1S2+, Regular rate and rhythm; No murmurs, rubs, or gallops  ABDOMEN: Soft, Nontender, Nondistended; Bowel sounds present  EXTREMITIES:  2+ Peripheral Pulses, No clubbing, cyanosis, or edema  SKIN: No rashes or lesions  NEURO: AAOX3, no focal deficits, no motor r sensory loss  PSYCH: normal mood        LABS:                        13.7   5.15  )-----------( 285      ( 02 Jul 2019 06:17 )             42.1     07-02    138  |  103  |  11.0  ----------------------------<  249<H>  4.7   |  25.0  |  0.87    Ca    9.4      02 Jul 2019 06:17  Mg     2.2     07-02    TPro  6.5<L>  /  Alb  3.5  /  TBili  <0.2<L>  /  DBili  x   /  AST  18  /  ALT  22  /  AlkPhos  86  07-02      MEDICATIONS  (STANDING):  ceFAZolin   IVPB 2000 milliGRAM(s) IV Intermittent every 8 hours  clindamycin IVPB 600 milliGRAM(s) IV Intermittent every 8 hours  dextrose 5%. 1000 milliLiter(s) (50 mL/Hr) IV Continuous <Continuous>  dextrose 50% Injectable 12.5 Gram(s) IV Push once  dextrose 50% Injectable 25 Gram(s) IV Push once  dextrose 50% Injectable 25 Gram(s) IV Push once  gabapentin 300 milliGRAM(s) Oral three times a day  ibuprofen  Tablet. 600 milliGRAM(s) Oral every 8 hours  insulin glargine Injectable (LANTUS) 40 Unit(s) SubCutaneous at bedtime  insulin lispro (HumaLOG) corrective regimen sliding scale   SubCutaneous three times a day before meals  insulin lispro Injectable (HumaLOG) 5 Unit(s) SubCutaneous three times a day before meals  lidocaine   Patch 1 Patch Transdermal daily  lisinopril 10 milliGRAM(s) Oral daily  saccharomyces boulardii 250 milliGRAM(s) Oral two times a day    MEDICATIONS  (PRN):  acetaminophen   Tablet .. 650 milliGRAM(s) Oral every 6 hours PRN Temp greater or equal to 38C (100.4F), Mild Pain (1 - 3)  cyclobenzaprine 5 milliGRAM(s) Oral three times a day PRN Muscle Spasm  dextrose 40% Gel 15 Gram(s) Oral once PRN Blood Glucose LESS THAN 70 milliGRAM(s)/deciliter  glucagon  Injectable 1 milliGRAM(s) IntraMuscular once PRN Glucose LESS THAN 70 milligrams/deciliter  HYDROmorphone  Injectable 1 milliGRAM(s) IV Push every 4 hours PRN Severe Pain (7 - 10)  LORazepam   Injectable 1 milliGRAM(s) IV Push once PRN mri  morphine  - Injectable 2 milliGRAM(s) IV Push every 4 hours PRN Moderate Pain (4 - 6)      RADIOLOGY & ADDITIONAL TESTS:    blood cultures in lab
[facility]  INFECTIOUS DISEASES AND INTERNAL MEDICINE at [place]  =======================================================  Jeremiah Walsh MD  Diplomates American Board of Internal Medicine and Infectious Diseases  Tel: [phone]      Fax: [phone]  =======================================================    JENNIFER CHRISTIE [id number]    Follow up: Neck Abscess    No fever or chills  No diarrhea      Allergies:  No Known Allergies      Antibiotics:  ceFAZolin   IVPB 2000 milliGRAM(s) IV Intermittent every 8 hours  clindamycin IVPB 600 milliGRAM(s) IV Intermittent every 8 hours      REVIEW OF SYSTEMS:  CONSTITUTIONAL:  No Fever or chills  HEENT:  No diplopia or blurred vision.  No earache, sore throat or runny nose.  CARDIOVASCULAR:  No pressure, squeezing, strangling, tightness, heaviness or aching about the chest, neck, axilla or epigastrium.  RESPIRATORY:  No cough, shortness of breath  GASTROINTESTINAL:  No nausea, vomiting or diarrhea.  GENITOURINARY:  No dysuria, frequency or urgency.   MUSCULOSKELETAL:  no joint aches, no muscle pain  SKIN:  + Neck Abscess decreased in size  NEUROLOGIC:  No Headaches, seizures or weakness.  PSYCHIATRIC:  No disorder of thought or mood.  ENDOCRINE:  No heat or cold intolerance  HEMATOLOGICAL:  No easy bruising or bleeding.       Physical Exam:  Vital Signs Last 24 Hrs  T(C): 36.8 (03 Jul 2019 07:44), Max: 36.8 (03 Jul 2019 07:44)  T(F): 98.2 (03 Jul 2019 07:44), Max: 98.2 (03 Jul 2019 07:44)  HR: 74 (03 Jul 2019 07:44) (70 - 78)  BP: 130/77 (03 Jul 2019 07:44) (116/78 - 130/77)  RR: 18 (03 Jul 2019 07:44) (18 - 18)  SpO2: 93% (03 Jul 2019 07:44) (92% - 96%)      GEN: NAD, pleasant  HEENT: normocephalic and atraumatic. EOMI. PERRL.  Anicteric  NECK: Supple.   LUNGS: Clear to auscultation.  HEART: Regular rate and rhythm   ABDOMEN: Soft, nontender, and nondistended.  Positive bowel sounds.    : No CVA tenderness  EXTREMITIES: Without any edema.  MSK: No joint swelling  NEUROLOGIC:  No Focal Deficits  PSYCHIATRIC: Appropriate affect .  SKIN: Neck Abscess impoved      Labs:  07-02    138  |  103  |  11.0  ----------------------------<  249<H>  4.7   |  25.0  |  0.87    Ca    9.4      02 Jul 2019 06:17  Mg     2.2     07-02    TPro  6.5<L>  /  Alb  3.5  /  TBili  <0.2<L>  /  DBili  x   /  AST  18  /  ALT  22  /  AlkPhos  86  07-02                          13.4   5.41  )-----------( 291      ( 03 Jul 2019 08:01 )             40.7     LIVER FUNCTIONS - ( 02 Jul 2019 06:17 )  Alb: 3.5 g/dL / Pro: 6.5 g/dL / ALK PHOS: 86 U/L / ALT: 22 U/L / AST: 18 U/L / GGT: x               RECENT CULTURES:  06-30 @ 23:12 .Blood     No growth at 48 hours      06-30 @ 23:11 .Blood     No growth at 48 hours

## 2019-07-06 LAB
CULTURE RESULTS: SIGNIFICANT CHANGE UP
CULTURE RESULTS: SIGNIFICANT CHANGE UP
SPECIMEN SOURCE: SIGNIFICANT CHANGE UP
SPECIMEN SOURCE: SIGNIFICANT CHANGE UP

## 2019-07-10 ENCOUNTER — APPOINTMENT (OUTPATIENT)
Dept: ORTHOPEDIC SURGERY | Facility: CLINIC | Age: 54
End: 2019-07-10

## 2019-07-11 NOTE — CDI QUERY NOTE - [id number]_GEN_ALL_CORE_HH
There seems to be a conflicting documentation regarding the procedure performed, was it incision and drainage or excisional debridement? Please clarify.    A.	Incision into the level of subcutaneous tissue  B.	Excisional debridement into the level of subcutaneous tissue  C.	Other, please specify  D.	Not clinically significant    Supporting Documentation:    7/2/19 6575 Procedure Note- Incision and Drainage:  PROCEDURE:   Procedure Name: Incision and Drainage	  Level: Subcutaneous	  Type of debridement: Excisional	  PROCEDURES:  Incision and drainage of neck 02-Jul-2019 16:45:07  Tanya Rose.

## 2019-07-23 ENCOUNTER — EMERGENCY (EMERGENCY)
Facility: HOSPITAL | Age: 54
LOS: 1 days | Discharge: HOME | End: 2019-07-23
Attending: EMERGENCY MEDICINE
Payer: COMMERCIAL

## 2019-07-23 VITALS
HEART RATE: 95 BPM | OXYGEN SATURATION: 98 % | HEIGHT: 66 IN | TEMPERATURE: 98 F | WEIGHT: 225.09 LBS | RESPIRATION RATE: 18 BRPM

## 2019-07-23 DIAGNOSIS — Z98.89 OTHER SPECIFIED POSTPROCEDURAL STATES: Chronic | ICD-10-CM

## 2019-07-23 LAB
ANION GAP SERPL CALC-SCNC: 12 MMOL/L — SIGNIFICANT CHANGE UP (ref 5–17)
APPEARANCE UR: CLEAR — SIGNIFICANT CHANGE UP
BACTERIA # UR AUTO: ABNORMAL
BASOPHILS # BLD AUTO: 0.01 K/UL — SIGNIFICANT CHANGE UP (ref 0–0.2)
BASOPHILS NFR BLD AUTO: 0.1 % — SIGNIFICANT CHANGE UP (ref 0–2)
BILIRUB UR-MCNC: NEGATIVE — SIGNIFICANT CHANGE UP
BUN SERPL-MCNC: 15 MG/DL — SIGNIFICANT CHANGE UP (ref 8–20)
CALCIUM SERPL-MCNC: 9.4 MG/DL — SIGNIFICANT CHANGE UP (ref 8.6–10.2)
CHLORIDE SERPL-SCNC: 102 MMOL/L — SIGNIFICANT CHANGE UP (ref 98–107)
CO2 SERPL-SCNC: 22 MMOL/L — SIGNIFICANT CHANGE UP (ref 22–29)
COLOR SPEC: YELLOW — SIGNIFICANT CHANGE UP
CREAT SERPL-MCNC: 0.82 MG/DL — SIGNIFICANT CHANGE UP (ref 0.5–1.3)
DIFF PNL FLD: ABNORMAL
EOSINOPHIL # BLD AUTO: 0.01 K/UL — SIGNIFICANT CHANGE UP (ref 0–0.5)
EOSINOPHIL NFR BLD AUTO: 0.1 % — SIGNIFICANT CHANGE UP (ref 0–6)
EPI CELLS # UR: ABNORMAL
GLUCOSE BLDC GLUCOMTR-MCNC: 370 MG/DL — HIGH (ref 70–99)
GLUCOSE SERPL-MCNC: 415 MG/DL — HIGH (ref 70–115)
GLUCOSE UR QL: 1000 MG/DL
HCG UR QL: NEGATIVE — SIGNIFICANT CHANGE UP
HCT VFR BLD CALC: 46.5 % — HIGH (ref 34.5–45)
HGB BLD-MCNC: 15.6 G/DL — HIGH (ref 11.5–15.5)
IMM GRANULOCYTES NFR BLD AUTO: 0.3 % — SIGNIFICANT CHANGE UP (ref 0–1.5)
KETONES UR-MCNC: ABNORMAL
LEUKOCYTE ESTERASE UR-ACNC: ABNORMAL
LYMPHOCYTES # BLD AUTO: 1.23 K/UL — SIGNIFICANT CHANGE UP (ref 1–3.3)
LYMPHOCYTES # BLD AUTO: 16.9 % — SIGNIFICANT CHANGE UP (ref 13–44)
MCHC RBC-ENTMCNC: 31.1 PG — SIGNIFICANT CHANGE UP (ref 27–34)
MCHC RBC-ENTMCNC: 33.5 GM/DL — SIGNIFICANT CHANGE UP (ref 32–36)
MCV RBC AUTO: 92.6 FL — SIGNIFICANT CHANGE UP (ref 80–100)
MONOCYTES # BLD AUTO: 0.1 K/UL — SIGNIFICANT CHANGE UP (ref 0–0.9)
MONOCYTES NFR BLD AUTO: 1.4 % — LOW (ref 2–14)
NEUTROPHILS # BLD AUTO: 5.92 K/UL — SIGNIFICANT CHANGE UP (ref 1.8–7.4)
NEUTROPHILS NFR BLD AUTO: 81.2 % — HIGH (ref 43–77)
NITRITE UR-MCNC: NEGATIVE — SIGNIFICANT CHANGE UP
PH UR: 5 — SIGNIFICANT CHANGE UP (ref 5–8)
PLATELET # BLD AUTO: 277 K/UL — SIGNIFICANT CHANGE UP (ref 150–400)
POTASSIUM SERPL-MCNC: 4.8 MMOL/L — SIGNIFICANT CHANGE UP (ref 3.5–5.3)
POTASSIUM SERPL-SCNC: 4.8 MMOL/L — SIGNIFICANT CHANGE UP (ref 3.5–5.3)
PROT UR-MCNC: 15 MG/DL
RBC # BLD: 5.02 M/UL — SIGNIFICANT CHANGE UP (ref 3.8–5.2)
RBC # FLD: 13.7 % — SIGNIFICANT CHANGE UP (ref 10.3–14.5)
RBC CASTS # UR COMP ASSIST: SIGNIFICANT CHANGE UP /HPF (ref 0–4)
SODIUM SERPL-SCNC: 136 MMOL/L — SIGNIFICANT CHANGE UP (ref 135–145)
SP GR SPEC: 1.02 — SIGNIFICANT CHANGE UP (ref 1.01–1.02)
UROBILINOGEN FLD QL: NEGATIVE MG/DL — SIGNIFICANT CHANGE UP
WBC # BLD: 7.29 K/UL — SIGNIFICANT CHANGE UP (ref 3.8–10.5)
WBC # FLD AUTO: 7.29 K/UL — SIGNIFICANT CHANGE UP (ref 3.8–10.5)
WBC UR QL: SIGNIFICANT CHANGE UP

## 2019-07-23 PROCEDURE — 99218: CPT

## 2019-07-23 RX ORDER — INSULIN LISPRO 100/ML
15 VIAL (ML) SUBCUTANEOUS
Refills: 0 | Status: DISCONTINUED | OUTPATIENT
Start: 2019-07-23 | End: 2019-07-28

## 2019-07-23 RX ORDER — DIAZEPAM 5 MG
10 TABLET ORAL ONCE
Refills: 0 | Status: DISCONTINUED | OUTPATIENT
Start: 2019-07-23 | End: 2019-07-24

## 2019-07-23 RX ORDER — LIDOCAINE 4 G/100G
1 CREAM TOPICAL ONCE
Refills: 0 | Status: COMPLETED | OUTPATIENT
Start: 2019-07-23 | End: 2019-07-23

## 2019-07-23 RX ORDER — DEXTROSE 50 % IN WATER 50 %
12.5 SYRINGE (ML) INTRAVENOUS ONCE
Refills: 0 | Status: DISCONTINUED | OUTPATIENT
Start: 2019-07-23 | End: 2019-07-28

## 2019-07-23 RX ORDER — OXYCODONE AND ACETAMINOPHEN 5; 325 MG/1; MG/1
2 TABLET ORAL EVERY 6 HOURS
Refills: 0 | Status: DISCONTINUED | OUTPATIENT
Start: 2019-07-23 | End: 2019-07-24

## 2019-07-23 RX ORDER — METFORMIN HYDROCHLORIDE 850 MG/1
500 TABLET ORAL AT BEDTIME
Refills: 0 | Status: DISCONTINUED | OUTPATIENT
Start: 2019-07-23 | End: 2019-07-28

## 2019-07-23 RX ORDER — GLUCAGON INJECTION, SOLUTION 0.5 MG/.1ML
1 INJECTION, SOLUTION SUBCUTANEOUS ONCE
Refills: 0 | Status: DISCONTINUED | OUTPATIENT
Start: 2019-07-23 | End: 2019-07-28

## 2019-07-23 RX ORDER — METHOCARBAMOL 500 MG/1
1500 TABLET, FILM COATED ORAL
Refills: 0 | Status: DISCONTINUED | OUTPATIENT
Start: 2019-07-23 | End: 2019-07-28

## 2019-07-23 RX ORDER — DEXTROSE 50 % IN WATER 50 %
25 SYRINGE (ML) INTRAVENOUS ONCE
Refills: 0 | Status: DISCONTINUED | OUTPATIENT
Start: 2019-07-23 | End: 2019-07-28

## 2019-07-23 RX ORDER — INSULIN GLARGINE 100 [IU]/ML
40 INJECTION, SOLUTION SUBCUTANEOUS AT BEDTIME
Refills: 0 | Status: DISCONTINUED | OUTPATIENT
Start: 2019-07-23 | End: 2019-07-28

## 2019-07-23 RX ORDER — GLIMEPIRIDE 1 MG
2 TABLET ORAL
Refills: 0 | Status: DISCONTINUED | OUTPATIENT
Start: 2019-07-23 | End: 2019-07-28

## 2019-07-23 RX ORDER — IBUPROFEN 200 MG
800 TABLET ORAL EVERY 6 HOURS
Refills: 0 | Status: DISCONTINUED | OUTPATIENT
Start: 2019-07-23 | End: 2019-07-28

## 2019-07-23 RX ORDER — LISINOPRIL 2.5 MG/1
2.5 TABLET ORAL DAILY
Refills: 0 | Status: DISCONTINUED | OUTPATIENT
Start: 2019-07-23 | End: 2019-07-28

## 2019-07-23 RX ORDER — DEXTROSE 50 % IN WATER 50 %
15 SYRINGE (ML) INTRAVENOUS ONCE
Refills: 0 | Status: DISCONTINUED | OUTPATIENT
Start: 2019-07-23 | End: 2019-07-28

## 2019-07-23 RX ORDER — METHOCARBAMOL 500 MG/1
1500 TABLET, FILM COATED ORAL ONCE
Refills: 0 | Status: COMPLETED | OUTPATIENT
Start: 2019-07-23 | End: 2019-07-23

## 2019-07-23 RX ORDER — METOPROLOL TARTRATE 50 MG
25 TABLET ORAL DAILY
Refills: 0 | Status: DISCONTINUED | OUTPATIENT
Start: 2019-07-23 | End: 2019-07-23

## 2019-07-23 RX ORDER — DEXAMETHASONE 0.5 MG/5ML
10 ELIXIR ORAL ONCE
Refills: 0 | Status: COMPLETED | OUTPATIENT
Start: 2019-07-23 | End: 2019-07-23

## 2019-07-23 RX ORDER — SODIUM CHLORIDE 9 MG/ML
1000 INJECTION, SOLUTION INTRAVENOUS
Refills: 0 | Status: DISCONTINUED | OUTPATIENT
Start: 2019-07-23 | End: 2019-07-28

## 2019-07-23 RX ORDER — KETOROLAC TROMETHAMINE 30 MG/ML
60 SYRINGE (ML) INJECTION ONCE
Refills: 0 | Status: DISCONTINUED | OUTPATIENT
Start: 2019-07-23 | End: 2019-07-23

## 2019-07-23 RX ORDER — ATORVASTATIN CALCIUM 80 MG/1
10 TABLET, FILM COATED ORAL AT BEDTIME
Refills: 0 | Status: DISCONTINUED | OUTPATIENT
Start: 2019-07-23 | End: 2019-07-28

## 2019-07-23 RX ADMIN — Medication 15 UNIT(S): at 22:39

## 2019-07-23 RX ADMIN — LISINOPRIL 2.5 MILLIGRAM(S): 2.5 TABLET ORAL at 22:40

## 2019-07-23 RX ADMIN — OXYCODONE AND ACETAMINOPHEN 2 TABLET(S): 5; 325 TABLET ORAL at 23:14

## 2019-07-23 RX ADMIN — OXYCODONE AND ACETAMINOPHEN 2 TABLET(S): 5; 325 TABLET ORAL at 23:47

## 2019-07-23 RX ADMIN — Medication 60 MILLIGRAM(S): at 22:20

## 2019-07-23 RX ADMIN — Medication 10 MILLIGRAM(S): at 16:53

## 2019-07-23 RX ADMIN — Medication 800 MILLIGRAM(S): at 22:39

## 2019-07-23 RX ADMIN — INSULIN GLARGINE 40 UNIT(S): 100 INJECTION, SOLUTION SUBCUTANEOUS at 22:38

## 2019-07-23 RX ADMIN — METHOCARBAMOL 1500 MILLIGRAM(S): 500 TABLET, FILM COATED ORAL at 16:53

## 2019-07-23 RX ADMIN — METFORMIN HYDROCHLORIDE 500 MILLIGRAM(S): 850 TABLET ORAL at 22:40

## 2019-07-23 RX ADMIN — LIDOCAINE 1 PATCH: 4 CREAM TOPICAL at 16:50

## 2019-07-23 RX ADMIN — LIDOCAINE 1 PATCH: 4 CREAM TOPICAL at 22:19

## 2019-07-23 RX ADMIN — Medication 60 MILLIGRAM(S): at 16:52

## 2019-07-23 RX ADMIN — ATORVASTATIN CALCIUM 10 MILLIGRAM(S): 80 TABLET, FILM COATED ORAL at 22:40

## 2019-07-23 RX ADMIN — METHOCARBAMOL 1500 MILLIGRAM(S): 500 TABLET, FILM COATED ORAL at 22:40

## 2019-07-23 NOTE — ED PROVIDER NOTE - PHYSICAL EXAMINATION
General: Patient is an obese female in severe discomfort secondary to pain, writhing on the stretcher and tearful.  AAOx3.  Cooperative, answering questions appropriately.  Respiratory: CTA B/L with equal breath sounds throughout.  CV: RRR, +S1/S2, no murmurs, gallops, or rubs.  MSK:   Neurologic: General: Patient is an obese female in severe discomfort secondary to pain, writhing on the stretcher and tearful.  AAOx3.  Cooperative, answering questions appropriately.  Respiratory: CTA B/L with equal breath sounds throughout.  CV: RRR, +S1/S2, no murmurs, gallops, or rubs.  MSK: Limited exam secondary to patient pain.  Neurologic: Good rectal tone.  Decreased sensation in the L4-S1 distribution of the LLE/foot.  Motor grossly intact.  Unsteady gait secondary to pain.

## 2019-07-23 NOTE — ED PROVIDER NOTE - CROS ED CARDIOVAS ALL NEG
Spoke with Jes at ext [phone]. Scheduled patient appointment for 05/10/18 with Dr Hitchcock   negative...

## 2019-07-23 NOTE — ED PROVIDER NOTE - MUSCULOSKELETAL MINIMAL EXAM
TENDERNESS/RANGE OF MOTION LIMITED Limited exam secondary to patient pain.  ROM of LLE limited. (+) TTP of LEFT lumbar region, no midline tenderness./TENDERNESS/RANGE OF MOTION LIMITED

## 2019-07-23 NOTE — ED PROVIDER NOTE - ATTENDING CONTRIBUTION TO CARE
I, Arron Trinh, performed a face to face bedside interview with this patient regarding history of present illness, review of symptoms and relevant past medical, social and family history.  I completed an independent physical examination. I have communicated the patient’s plan of care and disposition with the ACP.      54 yo female pmh dm, herniated disc with worsening back pain with numbness of left lower digits;   pe back  moderate tenderness left paraspinal lumbar region;   decreased sensation  left 5th toe;   dx back pain with radiculopathy; pain management, mri , spine eval;

## 2019-07-23 NOTE — CONSULT NOTE ADULT - ATTENDING COMMENTS
Attending statement:  I have personally seen this patient, and formed a face to face diagnostic evaluation on this patient on this date.  I have reviewed the PA, NP and or Medical/PA student and/or Resident documentation and agree with the history, physical exam and plan of care except if noted otherwise.     pt was seen and examined this am mri from 7/1/19 was reviewed there is no emergent op concerns. I have recommended a new lumbar mri to make sure there is no sig change at L5-S1. Pt will cont with pain mngt recs Attending statement:  I have personally seen this patient, and formed a face to face diagnostic evaluation on this patient on this date.  I have reviewed the PA, NP and or Medical/PA student and/or Resident documentation and agree with the history, physical exam and plan of care except if noted otherwise.     pt was seen and examined this am mri from 7/1/19 was reviewed there is no emergent op concerns. I have recommended a new lumbar mri to make sure there is no sig change at L5-S1. Pt will cont with pain mngt recs.    Reviewed new mri with chronic discogenic changes on the left. No acute operative recs at this time FU with pain mngt

## 2019-07-23 NOTE — ED PROVIDER NOTE - PROGRESS NOTE DETAILS
spoke to pain management MD Carrero who is evaluating patient for epidural injections plan for 7/26 spoke to Karthik from ortho for spine consult, concern for new findings of sensory loss on L LE, possible rpt MRI case signed out to ROBERT SANTOYO for possible MRI/spine cx (pending) case signed out to ROBERT TESFAYE for possible MRI/spine cx (pending) Grayson Nobles PA-C: Pt seen found to have decreased sensation to LT toe, withdraw to painful stimuli.   Seen by Spine recommended MRI, placed on OBS pending MRI.

## 2019-07-23 NOTE — CONSULT NOTE ADULT - SUBJECTIVE AND OBJECTIVE BOX
Pt Name: JENNIFER CHRISTIE    MRN: [id number]    Patient is a 53y Female presenting to the emergency department with a chief complaint of low back pain radiating down left leg with associated weakness and numbness since she went on vacation 5/29/19. Patient did see her PMD regarding her back pain and was referred to pain management for ANTONIO which was scheduled last Friday however was unable to be performed due to insurance reasons. Patient was rx Percocet which did not help. Patient states she is scheduled for ANTONIO this coming Friday with pain management. Patient notes numbness in left 4th and 5th toes. Patient denies incontinence, saddle anesthesia. Denies neck pain.    REVIEW OF SYSTEMS  General:	denies fever, chills    Skin/Breast: denies rashes  	  Respiratory and Thorax: denies SOB  	  Cardiovascular:	denies CP    Gastrointestinal:	denies Abdominal pain    Genitourinary:	denies incontinence    Musculoskeletal:	 + back pain    Neurological:	+ numbness left foot    PAST MEDICAL & SURGICAL HISTORY:  Achilles tendon injury: right  Obesity (BMI 30-39.9)  H/O tear of meniscus of knee joint: left  ADHD (attention deficit hyperactivity disorder)  H/O colonoscopy  History of arthroscopy of left shoulder: 2013  H/O Achilles tendon repair: right, 2001    Allergies: No Known Allergies    Medications: see med rec    FAMILY HISTORY:  Family history of breast cancer (Grandparent)  Family history of diabetes mellitus (Sibling)  : non-contributory    Social History: denies ETOH  + smoking  denies illicit drug use    Ambulation: Walking With Cane     PHYSICAL EXAM:    Vital Signs Last 24 Hrs  T(C): 36.7 (23 Jul 2019 15:24), Max: 36.7 (23 Jul 2019 15:24)  T(F): 98 (23 Jul 2019 15:24), Max: 98 (23 Jul 2019 15:24)  HR: 95 (23 Jul 2019 15:24) (95 - 95)  RR: 18 (23 Jul 2019 15:24) (18 - 18)  SpO2: 98% (23 Jul 2019 15:24) (98% - 98%)    PE:  Alert, responsive, in no acute distress.  Back: Skin intact, no ecchymosis, erythema or open wounds, + TTP over L spine and left buttock, T spine grossly NT  Pathologic reflexes: right Clonus negative, left clonus too painful to perform      Negative babinski B/L  Motor exam: [  ]         Upper extremity                          Bi(c5)  WE(c6)  EE(c7)   FF(c8)                                                R         5/5        5/5        5/5       5/5                                               L          5/5        5/5        5/5       5/5        Lower extremeity                        HF(l2)   KE(l3)    TA(l4)     EHL(l5)   GS(s1)                                                 R        5/5        5/5        5/5       5/5         5/5                                               L         3/5        3/5       3/5       2/5          3/5    SILT L2-S2 intact B/L , DP pulses 2+ B/L  Calf soft, NT B/L    Imaging Studies: < from: MR Lumbar Spine No Cont (07.01.19 @ 09:01) >  IMPRESSION: Broad-based disc herniation at L5/S1. No evidence of exiting   nerve root compression.    < end of copied text >    A/P:  Pt is a  53y Female with L5/S1 HNP    PLAN:  ·	Pain control - Toradol, Robaxin for now  ·	Possible further imaging - to be d/w Dr. Perez  ·	Recs to follow  ·	D/w Dr. Perez Pt Name: JENNIFER CHRISTIE    MRN: [id number]    Patient is a 53y Female presenting to the emergency department with a chief complaint of low back pain radiating down left leg with associated weakness and numbness since she went on vacation 5/29/19. Patient did see her PMD regarding her back pain and was referred to pain management for ANTONIO which was scheduled last Friday however was unable to be performed due to insurance reasons. Patient was rx Percocet which did not help. Patient states she is scheduled for ANTONIO this coming Friday with pain management. Patient notes numbness in left 4th and 5th toes. Patient denies incontinence, saddle anesthesia. Denies neck pain.    REVIEW OF SYSTEMS  General:	denies fever, chills    Skin/Breast: denies rashes  	  Respiratory and Thorax: denies SOB  	  Cardiovascular:	denies CP    Gastrointestinal:	denies Abdominal pain    Genitourinary:	denies incontinence    Musculoskeletal:	 + back pain    Neurological:	+ numbness left foot    PAST MEDICAL & SURGICAL HISTORY:  Achilles tendon injury: right  Obesity (BMI 30-39.9)  H/O tear of meniscus of knee joint: left  ADHD (attention deficit hyperactivity disorder)  H/O colonoscopy  History of arthroscopy of left shoulder: 2013  H/O Achilles tendon repair: right, 2001    Allergies: No Known Allergies    Medications: see med rec    FAMILY HISTORY:  Family history of breast cancer (Grandparent)  Family history of diabetes mellitus (Sibling)  : non-contributory    Social History: denies ETOH  + smoking  denies illicit drug use    Ambulation: Walking With Cane     PHYSICAL EXAM:    Vital Signs Last 24 Hrs  T(C): 36.7 (23 Jul 2019 15:24), Max: 36.7 (23 Jul 2019 15:24)  T(F): 98 (23 Jul 2019 15:24), Max: 98 (23 Jul 2019 15:24)  HR: 95 (23 Jul 2019 15:24) (95 - 95)  RR: 18 (23 Jul 2019 15:24) (18 - 18)  SpO2: 98% (23 Jul 2019 15:24) (98% - 98%)    PE:  Alert, responsive, in no acute distress.  Back: Skin intact, no ecchymosis, erythema or open wounds, + TTP over L spine and left buttock, T spine grossly NT  Pathologic reflexes: right Clonus negative, left clonus too painful to perform      Negative babinski B/L  Motor exam: [  ]         Upper extremity                          Bi(c5)  WE(c6)  EE(c7)   FF(c8)                                                R         5/5        5/5        5/5       5/5                                               L          5/5        5/5        5/5       5/5        Lower extremeity                        HF(l2)   KE(l3)    TA(l4)     EHL(l5)   GS(s1)                                                 R        5/5        5/5        5/5       5/5         5/5                                               L         3/5        3/5       3/5       2/5          3/5    SILT L2-S2 intact B/L however decreased left LE throughout thigh and lower leg as well as lateral foot , DP pulses 2+ B/L  Calf soft, NT B/L    Imaging Studies: < from: MR Lumbar Spine No Cont (07.01.19 @ 09:01) >  IMPRESSION: Broad-based disc herniation at L5/S1. No evidence of exiting   nerve root compression.    < end of copied text >    A/P:  Pt is a  53y Female with L5/S1 HNP    PLAN:  ·	Pain control - Toradol, Robaxin for now  ·	Possible further imaging - to be d/w Dr. Perez  ·	Recs to follow  ·	D/w Dr. Perez Pt Name: JENNIFER CHRISTIE    MRN: [id number]    Patient is a 53y Female presenting to the emergency department with a chief complaint of low back pain radiating down left leg with associated weakness and numbness since she went on vacation 5/29/19. Patient did see her PMD regarding her back pain and was referred to pain management for ANTONIO which was scheduled last Friday however was unable to be performed due to insurance reasons. Patient was rx Percocet which did not help. Patient states she is scheduled for ANTONIO this coming Friday with pain management. Patient notes numbness in left 4th and 5th toes. Patient denies incontinence, saddle anesthesia. Denies neck pain.    REVIEW OF SYSTEMS  General:	denies fever, chills    Skin/Breast: denies rashes  	  Respiratory and Thorax: denies SOB  	  Cardiovascular:	denies CP    Gastrointestinal:	denies Abdominal pain    Genitourinary:	denies incontinence    Musculoskeletal:	 + back pain    Neurological:	+ numbness left foot    PAST MEDICAL & SURGICAL HISTORY:  Achilles tendon injury: right  Obesity (BMI 30-39.9)  H/O tear of meniscus of knee joint: left  ADHD (attention deficit hyperactivity disorder)  H/O colonoscopy  History of arthroscopy of left shoulder: 2013  H/O Achilles tendon repair: right, 2001    Allergies: No Known Allergies    Medications: see med rec    FAMILY HISTORY:  Family history of breast cancer (Grandparent)  Family history of diabetes mellitus (Sibling)  : non-contributory    Social History: denies ETOH  + smoking  denies illicit drug use    Ambulation: Walking With Cane     PHYSICAL EXAM:    Vital Signs Last 24 Hrs  T(C): 36.7 (23 Jul 2019 15:24), Max: 36.7 (23 Jul 2019 15:24)  T(F): 98 (23 Jul 2019 15:24), Max: 98 (23 Jul 2019 15:24)  HR: 95 (23 Jul 2019 15:24) (95 - 95)  RR: 18 (23 Jul 2019 15:24) (18 - 18)  SpO2: 98% (23 Jul 2019 15:24) (98% - 98%)    PE:  Alert, responsive, in no acute distress.  Back: Skin intact, no ecchymosis, erythema or open wounds, + TTP over L spine and left buttock, T spine grossly NT  Pathologic reflexes: right Clonus negative, left clonus too painful to perform      Negative babinski B/L  Motor exam: [  ]         Upper extremity                          Bi(c5)  WE(c6)  EE(c7)   FF(c8)                                                R         5/5        5/5        5/5       5/5                                               L          5/5        5/5        5/5       5/5        Lower extremeity                        HF(l2)   KE(l3)    TA(l4)     EHL(l5)   GS(s1)                                                 R        5/5        5/5        5/5       5/5         5/5                                               L         3/5        3/5       3/5       2/5          3/5    SILT L2-S2 intact B/L however decreased left LE throughout thigh and lower leg as well as lateral foot , DP pulses 2+ B/L  Calf soft, NT B/L    Imaging Studies: < from: MR Lumbar Spine No Cont (07.01.19 @ 09:01) >  IMPRESSION: Broad-based disc herniation at L5/S1. No evidence of exiting   nerve root compression.    < end of copied text >    A/P:  Pt is a  53y Female with L5/S1 HNP    PLAN:  ·	Pain control - Toradol, Robaxin for now  ·	MRI lumbar spine - ordered  ·	Recs to follow  ·	D/w Dr. Perez- definitive plan pending review of ordered imaging

## 2019-07-23 NOTE — ED ADULT NURSE REASSESSMENT NOTE - NS ED NURSE REASSESS COMMENT FT1
assumed care of pt @ 2200, report received from Beronica VILLARREAL RN, charting as noted. pt AOx4 c/o lower back pain. pt awaiting repeat MRI of lumbar spine in AM. pt given Motrin and Robaxin for pain along with home medications. awaiting pain management consult. pt outstanding for labs and IV placement. IV placed and blood collected by this RN and sent to lab.    HR is WNL, lung sounds are clear b/l, abd is soft and nontender with positive bowel sounds in all four quadrants, skin is warm, dry and appropriate for age and race. pt educated on plan of care and observation stay. Plan of care taught back to RN. Proficiency determined from successful pt teach back. Pt oriented to unit, staff, and room. Pt reeducated on call bell use. Bed locked in lowest position, call bell within reach. All questions and concerns addressed.

## 2019-07-23 NOTE — ED ADULT NURSE REASSESSMENT NOTE - NS ED NURSE REASSESS COMMENT FT1
pt c/o unrelieved back pain. per pt motrin and robaxin did not help. pt requesting additional pain medications at this time. ROBERT Nobles made aware. new order received for percocet x2 tablets. pt educated on medications. all questions and concerns addressed.

## 2019-07-23 NOTE — ED PROVIDER NOTE - OBJECTIVE STATEMENT
Patient is a 53 year old female with PMH of DM II on Metformin & insulin sent to the ED by pain management, Dr. Martina Pineda for lower back pain x 1 week.  Patient states that lower left back pain began 2 months ago following a long car ride.  Since that time, patient was admitted to [facility] on 06/30 for a neck abscess and had an MRI of the spine performed which revealed a herniated disc of the lumbar region.   At that time, pain was manageable but worsened acutely over the past week but patient unable to identify provoking factor.  Pain begins in left lumbar region and radiates down the left leg to the bottom of her left foot.  Described as "shooting" and rated a 10/10 in severity, pain has become so intolerable patient began ambulating with a cane yesterday and taking more percocet that prescribed in combination of acetaminophen 500mg, last dose taken at 1300.  Patient was scheduled to receive epidural injection last Friday 07/19, but still awaiting insurance approval.  Pain is associated with numbness and tingling of the left foot, difficult ambulation, and nausea.  Denies urinary or stool incontinece, dizziness, or weakness. Patient is a 53 year old female with PMH of DM II on Metformin & insulin sent to the ED by pain management, Dr. Martina Pineda for lower back pain x5 days.  Patient states that lower left back pain began 2 months ago following a long car ride.  Since that time, patient was admitted to [facility] on 06/30 for a neck abscess and had an MRI of the spine performed which revealed a herniated disc of the lumbar region- failed to f/u with spine as recommended due to insurance difficulties.  At that time, pain was manageable but worsened acutely over the past 5 days but patient unable to identify provoking factor.  Pain begins in left lumbar region and radiates down the left leg to the bottom of her left foot.  Described as "shooting" and rated a 10/10 in severity, pain has become so intolerable patient began ambulating with a cane yesterday and taking more percocet that prescribed in combination of acetaminophen 500mg, last dose taken at 1300.  Patient was scheduled to receive epidural injection last Friday 07/19, but still awaiting insurance approval.  Pain is associated with numbness and tingling of the left foot, difficult ambulation, and nausea.  Denies urinary or stool incontinence, dizziness, or weakness.

## 2019-07-23 NOTE — ED PROVIDER NOTE - CLINICAL SUMMARY MEDICAL DECISION MAKING FREE TEXT BOX
Patient is a 52 y/o female with worsening of chronic back pain.  On exam, patient with TTP of lumbar spine, (+) straight leg raise on LEFT, decreased ROM, strength on left leg 2/2 pain, and decreased sensation  .  No saddle anesthesia, no bladder or bowel incontinence.  MRI on 07/01 showing,  "Broad-based disc herniation at L5/S1. No evidence of exiting nerve root compression."  Pain control Patient is a 52 y/o female with worsening of chronic back pain.  MRI on 07/01, "Broad-based disc herniation at L5/S1. No evidence of exiting nerve root compression."  On exam, patient with TTP of lumbar spine, (+) straight leg raise on LEFT and diminished sensation in S1 distribution on LEFT foot.  No saddle anesthesia, no bladder or bowel incontinence, good rectal tone.  Symptoms consistent with possible nerve root compression, will consider repeat MRI.  Pain control.

## 2019-07-24 VITALS
OXYGEN SATURATION: 98 % | HEART RATE: 65 BPM | RESPIRATION RATE: 18 BRPM | TEMPERATURE: 98 F | SYSTOLIC BLOOD PRESSURE: 128 MMHG | DIASTOLIC BLOOD PRESSURE: 87 MMHG

## 2019-07-24 LAB
GLUCOSE BLDC GLUCOMTR-MCNC: 171 MG/DL — HIGH (ref 70–99)
GLUCOSE BLDC GLUCOMTR-MCNC: 398 MG/DL — HIGH (ref 70–99)

## 2019-07-24 PROCEDURE — 85027 COMPLETE CBC AUTOMATED: CPT

## 2019-07-24 PROCEDURE — 81001 URINALYSIS AUTO W/SCOPE: CPT

## 2019-07-24 PROCEDURE — 72148 MRI LUMBAR SPINE W/O DYE: CPT

## 2019-07-24 PROCEDURE — 87086 URINE CULTURE/COLONY COUNT: CPT

## 2019-07-24 PROCEDURE — 99284 EMERGENCY DEPT VISIT MOD MDM: CPT

## 2019-07-24 PROCEDURE — 99284 EMERGENCY DEPT VISIT MOD MDM: CPT | Mod: 25

## 2019-07-24 PROCEDURE — G0378: CPT

## 2019-07-24 PROCEDURE — 80048 BASIC METABOLIC PNL TOTAL CA: CPT

## 2019-07-24 PROCEDURE — 96374 THER/PROPH/DIAG INJ IV PUSH: CPT

## 2019-07-24 PROCEDURE — 99217: CPT

## 2019-07-24 PROCEDURE — 81025 URINE PREGNANCY TEST: CPT

## 2019-07-24 PROCEDURE — 82962 GLUCOSE BLOOD TEST: CPT

## 2019-07-24 PROCEDURE — 96372 THER/PROPH/DIAG INJ SC/IM: CPT | Mod: XU

## 2019-07-24 PROCEDURE — 72148 MRI LUMBAR SPINE W/O DYE: CPT | Mod: 26

## 2019-07-24 PROCEDURE — 36415 COLL VENOUS BLD VENIPUNCTURE: CPT

## 2019-07-24 RX ORDER — INSULIN LISPRO 100/ML
10 VIAL (ML) SUBCUTANEOUS ONCE
Refills: 0 | Status: COMPLETED | OUTPATIENT
Start: 2019-07-24 | End: 2019-07-24

## 2019-07-24 RX ADMIN — Medication 800 MILLIGRAM(S): at 00:56

## 2019-07-24 RX ADMIN — LISINOPRIL 2.5 MILLIGRAM(S): 2.5 TABLET ORAL at 10:38

## 2019-07-24 RX ADMIN — Medication 10 MILLIGRAM(S): at 08:03

## 2019-07-24 RX ADMIN — Medication 800 MILLIGRAM(S): at 10:38

## 2019-07-24 RX ADMIN — Medication 800 MILLIGRAM(S): at 07:40

## 2019-07-24 RX ADMIN — LIDOCAINE 1 PATCH: 4 CREAM TOPICAL at 05:01

## 2019-07-24 RX ADMIN — Medication 800 MILLIGRAM(S): at 04:59

## 2019-07-24 RX ADMIN — Medication 2 MILLIGRAM(S): at 10:37

## 2019-07-24 RX ADMIN — OXYCODONE AND ACETAMINOPHEN 2 TABLET(S): 5; 325 TABLET ORAL at 05:52

## 2019-07-24 RX ADMIN — OXYCODONE AND ACETAMINOPHEN 2 TABLET(S): 5; 325 TABLET ORAL at 07:41

## 2019-07-24 RX ADMIN — Medication 800 MILLIGRAM(S): at 11:40

## 2019-07-24 RX ADMIN — Medication 10 UNIT(S): at 01:06

## 2019-07-24 RX ADMIN — METHOCARBAMOL 1500 MILLIGRAM(S): 500 TABLET, FILM COATED ORAL at 10:39

## 2019-07-24 RX ADMIN — Medication 15 UNIT(S): at 10:33

## 2019-07-24 RX ADMIN — METHOCARBAMOL 1500 MILLIGRAM(S): 500 TABLET, FILM COATED ORAL at 04:59

## 2019-07-24 NOTE — ED CDU PROVIDER INITIAL DAY NOTE - ATTENDING CONTRIBUTION TO CARE
I, Arron Trinh, performed a face to face bedside interview with this patient regarding history of present illness, review of symptoms and relevant past medical, social and family history.  I completed an independent physical examination. I have communicated the patient’s plan of care and disposition with the ACP.      52 yo female pmh herniated disc in pain management with worsening back pain and numbness of left 5th toe; pt admited to observation for intractable back  pain; spine consult and  recommendations; I, Arron Trinh, performed a face to face bedside interview with this patient regarding history of present illness, review of symptoms and relevant past medical, social and family history.  I completed an independent physical examination. I have communicated the patient’s plan of care and disposition with the ACP.      52 yo female pmh herniated disc in pain management with worsening back pain and numbness of left 5th toe; pt admitted to observation for intractable back  pain; spine consult and  recommendations;

## 2019-07-24 NOTE — ED CDU PROVIDER DISPOSITION NOTE - ATTENDING CONTRIBUTION TO CARE
pt with mri lumbosacral spine with noted disc protrusion with s1 nerve root involvemnt; pt to  continue present medicaitons and follow up with pain management  as scheduled

## 2019-07-24 NOTE — ED CDU PROVIDER INITIAL DAY NOTE - MUSCULOSKELETAL MINIMAL EXAM
TENDERNESS/Limited exam secondary to patient pain.  ROM of LLE limited. (+) TTP of LEFT lumbar region, no midline tenderness./RANGE OF MOTION LIMITED

## 2019-07-24 NOTE — ED CDU PROVIDER INITIAL DAY NOTE - OBJECTIVE STATEMENT
Patient is a 53 year old female with PMH of DM II on Metformin & insulin sent to the ED by pain management, Dr. Martina Pineda for lower back pain x5 days.  Patient states that lower left back pain began 2 months ago following a long car ride.  Since that time, patient was admitted to [facility] on 06/30 for a neck abscess and had an MRI of the spine performed which revealed a herniated disc of the lumbar region- failed to f/u with spine as recommended due to insurance difficulties.  At that time, pain was manageable but worsened acutely over the past 5 days but patient unable to identify provoking factor.  Pain begins in left lumbar region and radiates down the left leg to the bottom of her left foot.  Described as "shooting" and rated a 10/10 in severity, pain has become so intolerable patient began ambulating with a cane yesterday and taking more percocet that prescribed in combination of acetaminophen 500mg, last dose taken at 1300.  Patient was scheduled to receive epidural injection last Friday 07/19, but still awaiting insurance approval.  Pain is associated with numbness and tingling of the left foot, difficult ambulation, and nausea.  Denies urinary or stool incontinence, dizziness, or weakness.

## 2019-07-24 NOTE — ED CDU PROVIDER INITIAL DAY NOTE - MEDICAL DECISION MAKING DETAILS
PT with  decreased sensation placed in obs for Diagnostic uncertainty. PT seen BY OBS PA found to be appropriate CDU PT care transferred to PA.

## 2019-07-24 NOTE — ED CDU PROVIDER INITIAL DAY NOTE - PROGRESS NOTE DETAILS
spoke to Karthik from ortho for spine consult, concern for new findings of sensory loss on L LE, possible rpt MRI case signed out to ROBERT TESFAYE for possible MRI/spine cx (pending)

## 2019-07-24 NOTE — PROGRESS NOTE ADULT - SUBJECTIVE AND OBJECTIVE BOX
ORTHO-SPINE PROGRESS NOTE:    Pt Name: JENNIFER CHRISTIE    MRN: [id number]      Patient is a 52 yo F being followed for lower back pain and left lower extremity radiculopathy. Patient denies recent injury. Pain has been present for several months and has been increasing in severity.  Patient was seen and evaluated in the ED. Patient continues to complain of poorly controlled low back pain radiating to the LLE. Pain has persisted despite medications. No new complaints. Patient endorses weakness and decreased sensation of the LLE. Patient denies fever, chills, malaise, incontinence.        PAST MEDICAL & SURGICAL HISTORY:  Achilles tendon injury: right  Obesity (BMI 30-39.9)  H/O tear of meniscus of knee joint: left  ADHD (attention deficit hyperactivity disorder)  H/O colonoscopy  History of arthroscopy of left shoulder: 2013  H/O Achilles tendon repair: right, 2001      Allergies: No Known Allergies      Medications: atorvastatin 10 milliGRAM(s) Oral at bedtime  dextrose 40% Gel 15 Gram(s) Oral once PRN  dextrose 5%. 1000 milliLiter(s) IV Continuous <Continuous>  dextrose 50% Injectable 12.5 Gram(s) IV Push once  dextrose 50% Injectable 25 Gram(s) IV Push once  dextrose 50% Injectable 25 Gram(s) IV Push once  glimepiride. 2 milliGRAM(s) Oral with breakfast  glucagon  Injectable 1 milliGRAM(s) IntraMuscular once PRN  ibuprofen  Tablet. 800 milliGRAM(s) Oral every 6 hours  insulin glargine Injectable (LANTUS) 40 Unit(s) SubCutaneous at bedtime  insulin lispro Injectable (HumaLOG) 15 Unit(s) SubCutaneous before breakfast  insulin lispro Injectable (HumaLOG) 15 Unit(s) SubCutaneous before lunch  insulin lispro Injectable (HumaLOG) 15 Unit(s) SubCutaneous before dinner  lisinopril 2.5 milliGRAM(s) Oral daily  metFORMIN 500 milliGRAM(s) Oral at bedtime  methocarbamol 1500 milliGRAM(s) Oral four times a day  oxyCODONE    5 mG/acetaminophen 325 mG 2 Tablet(s) Oral every 6 hours PRN        Ambulation: With Cane.                          15.6   7.29  )-----------( 277      ( 23 Jul 2019 22:26 )             46.5     07-23    136  |  102  |  15.0  ----------------------------<  415<H>  4.8   |  22.0  |  0.82    Ca    9.4      23 Jul 2019 22:26        PHYSICAL EXAM:    Vital Signs Last 24 Hrs  T(C): 36.6 (24 Jul 2019 07:21), Max: 36.7 (23 Jul 2019 15:24)  T(F): 97.8 (24 Jul 2019 07:21), Max: 98 (23 Jul 2019 15:24)  HR: 65 (24 Jul 2019 07:21) (65 - 95)  BP: 122/74 (24 Jul 2019 07:21) (122/74 - 156/81)  BP(mean): --  RR: 18 (24 Jul 2019 07:21) (18 - 18)  SpO2: 95% (24 Jul 2019 07:21) (95% - 98%)  Daily Height in cm: 167.64 (23 Jul 2019 15:24)    Daily     Appearance: Alert, responsive, in no acute distress.    Skin: no rash on visible skin. Skin is clean, dry and intact. No bleeding. No abrasions. No ulcerations.    Musculoskeletal:         Bilateral Lower Extremity: Sensation diminished L2-S1 LLE. Sensation is grossly intact to light touch in L2-S1 RLE. Calves supple and nontender bilaterally. Patient spontaneously moves both lower extremities.               HF(l2)   KE(l3)    TA(l4)     EHL(l5)   GS(s1)    R        5/5        5/5        5/5       5/5         5/5  L         3/5        3/5       3/5       2/5          3/5        A/P: Pt is a  53y Female with chronic LBP and LLE radiculopathy.      PLAN:   -Pain control.  -WBAT w/ cane.  -DVT PPx as per primary team.  -Prior imaging reviewed.  -New MRI obtained and reviewed.  -F/U pain management recommendations.  -No further orthopaedic intervention planned at this time.   -Ortho stable for discharge.  -Plan discussed w/ Dr. Perez.

## 2019-07-24 NOTE — ED CDU PROVIDER INITIAL DAY NOTE - LOCATION
Airway patent, Nasal mucosa clear. Mouth with normal mucosa. Throat has no vesicles, no oropharyngeal exudates and uvula is midline. back

## 2019-07-24 NOTE — ED ADULT NURSE REASSESSMENT NOTE - NS ED NURSE REASSESS COMMENT FT1
fingerstick reassessed following 15units of humalog. . PA Mallorie aware. new order received for one time dose of 10units humalog. pt educated on new orders, all questions and concerns addressed.

## 2019-07-24 NOTE — ED ADULT NURSE NOTE - INTERVENTIONS DEFINITIONS
[place] to call system/Call bell, personal items and telephone within reach/Stretcher in lowest position, wheels locked, appropriate side rails in place/Instruct patient to call for assistance/Physically safe environment: no spills, clutter or unnecessary equipment

## 2019-07-24 NOTE — ED ADULT NURSE REASSESSMENT NOTE - NS ED NURSE REASSESS COMMENT FT1
Pt alert and oriented, no apparent distress noted at this time. Pt handed off to jean BRADSHAW in stable condition.

## 2019-07-24 NOTE — ED CDU PROVIDER INITIAL DAY NOTE - PHYSICAL EXAMINATION
General: Patient is an obese female in severe discomfort secondary to pain, writhing on the stretcher and tearful.  AAOx3.  Cooperative, answering questions appropriately.  Respiratory: CTA B/L with equal breath sounds throughout.  CV: RRR, +S1/S2, no murmurs, gallops, or rubs.  MSK: Limited exam secondary to patient pain.  Neurologic: Good rectal tone.  Decreased sensation in the L4-S1 distribution of the LLE/foot.  Motor grossly intact.  Unsteady gait secondary to pain.

## 2019-07-25 LAB
CULTURE RESULTS: SIGNIFICANT CHANGE UP
SPECIMEN SOURCE: SIGNIFICANT CHANGE UP

## 2019-11-01 NOTE — DIETITIAN INITIAL EVALUATION ADULT. - EST PROTEIN NEEDS4
99.7 [Fatigue] : fatigue [Chest Pain] : chest pain [Shortness Of Breath] : shortness of breath [Negative] : Eyes [FreeTextEntry5] : varicose veins

## 2020-11-13 NOTE — PATIENT PROFILE ADULT - NSPROLASTMENSTRUALDT_GEN_A_NUR
Frutoso Mealing called requesting a refill on the following medications:  Requested Prescriptions     Pending Prescriptions Disp Refills    doxazosin (CARDURA) 4 MG tablet 90 tablet 3     Sig: Take 1 tablet by mouth nightly     Pharmacy verified:  yes      Date of last visit: 11-  Date of next visit (if applicable): 15/81/1440 01-Jun-2019

## 2021-06-19 NOTE — DISCHARGE NOTE PROVIDER - HOSPITAL COURSE
53 yof with pmh of neck abscess in the past, dm2 on insulin, presents from home with neck swelling and discomfort. Patient states she was previously treated for a neck abscess in a hospital in [facility]. Patient worked up in the er and found to have a neck abscess but initially refused bedside I and D.         patient admitted to medicine and seen by ID , cecy and surgery in consult. started on iv abx and finally agreed to I and D on 7/2 wihtout incident. Patient also had mri showing herniated disc for which she states she has been battling for over a month. patients cultures were negative and will be discharegd with po abx until 7/7        patient should call and make a follow-up appointment at the [facility] - [facility], [phone]        time spent on dc 32 minutes No Yes

## 2021-12-03 NOTE — ED PROVIDER NOTE - CARE PLAN
Zayra 45 Transitions Initial Follow Up Call    Call within 2 business days of discharge: Yes    Patient: Kim Junior Patient : 1941   MRN: [id number]  Reason for Admission: [address]   Discharge Date: 21 RARS: Readmission Risk Score: 11      Last Discharge [facility]       Complaint Diagnosis Description Type Department Provider    21 Loss of Consciousness Syncope and collapse . .. ED (DISCHARGE) MARIO ALBERTO Keith DO; Vesna. .. First attempt to reach the patient for initial Care Transition call post hospital discharge. Message left on mobile phone with CTN's contact information requesting return phone call.     Follow Up  Future Appointments   Date Time Provider Blanca Barraza   2022  1:00 PM DO SUNDAR Magana [facility]       Emiliano Peralta RN Principal Discharge DX:	Back pain Principal Discharge DX:	Back pain  Secondary Diagnosis:	Loss of sensation

## 2022-07-22 ENCOUNTER — OUTPATIENT (OUTPATIENT)
Dept: OUTPATIENT SERVICES | Facility: HOSPITAL | Age: 57
LOS: 1 days | End: 2022-07-22
Payer: COMMERCIAL

## 2022-07-22 VITALS
WEIGHT: 223.11 LBS | TEMPERATURE: 98 F | SYSTOLIC BLOOD PRESSURE: 139 MMHG | RESPIRATION RATE: 16 BRPM | HEART RATE: 64 BPM | DIASTOLIC BLOOD PRESSURE: 79 MMHG | HEIGHT: 66 IN | OXYGEN SATURATION: 99 %

## 2022-07-22 DIAGNOSIS — L90.5 SCAR CONDITIONS AND FIBROSIS OF SKIN: ICD-10-CM

## 2022-07-22 DIAGNOSIS — Z98.89 OTHER SPECIFIED POSTPROCEDURAL STATES: Chronic | ICD-10-CM

## 2022-07-22 DIAGNOSIS — L91.0 HYPERTROPHIC SCAR: ICD-10-CM

## 2022-07-22 DIAGNOSIS — N62 HYPERTROPHY OF BREAST: ICD-10-CM

## 2022-07-22 DIAGNOSIS — I10 ESSENTIAL (PRIMARY) HYPERTENSION: ICD-10-CM

## 2022-07-22 DIAGNOSIS — G47.33 OBSTRUCTIVE SLEEP APNEA (ADULT) (PEDIATRIC): ICD-10-CM

## 2022-07-22 DIAGNOSIS — Z01.818 ENCOUNTER FOR OTHER PREPROCEDURAL EXAMINATION: ICD-10-CM

## 2022-07-22 DIAGNOSIS — E11.9 TYPE 2 DIABETES MELLITUS WITHOUT COMPLICATIONS: ICD-10-CM

## 2022-07-22 DIAGNOSIS — N64.4 MASTODYNIA: ICD-10-CM

## 2022-07-22 LAB
ALBUMIN SERPL ELPH-MCNC: 3.7 G/DL — SIGNIFICANT CHANGE UP (ref 3.3–5)
ALP SERPL-CCNC: 75 U/L — SIGNIFICANT CHANGE UP (ref 40–120)
ALT FLD-CCNC: 25 U/L — SIGNIFICANT CHANGE UP (ref 12–78)
ANION GAP SERPL CALC-SCNC: 5 MMOL/L — SIGNIFICANT CHANGE UP (ref 5–17)
AST SERPL-CCNC: 14 U/L — LOW (ref 15–37)
BILIRUB SERPL-MCNC: 0.3 MG/DL — SIGNIFICANT CHANGE UP (ref 0.2–1.2)
BUN SERPL-MCNC: 14 MG/DL — SIGNIFICANT CHANGE UP (ref 7–23)
CALCIUM SERPL-MCNC: 9.1 MG/DL — SIGNIFICANT CHANGE UP (ref 8.5–10.1)
CHLORIDE SERPL-SCNC: 110 MMOL/L — HIGH (ref 96–108)
CO2 SERPL-SCNC: 29 MMOL/L — SIGNIFICANT CHANGE UP (ref 22–31)
CREAT SERPL-MCNC: 0.89 MG/DL — SIGNIFICANT CHANGE UP (ref 0.5–1.3)
EGFR: 76 ML/MIN/1.73M2 — SIGNIFICANT CHANGE UP
GLUCOSE SERPL-MCNC: 176 MG/DL — HIGH (ref 70–99)
HCT VFR BLD CALC: 42.4 % — SIGNIFICANT CHANGE UP (ref 34.5–45)
HGB BLD-MCNC: 14 G/DL — SIGNIFICANT CHANGE UP (ref 11.5–15.5)
MCHC RBC-ENTMCNC: 32.1 PG — SIGNIFICANT CHANGE UP (ref 27–34)
MCHC RBC-ENTMCNC: 33 GM/DL — SIGNIFICANT CHANGE UP (ref 32–36)
MCV RBC AUTO: 97.2 FL — SIGNIFICANT CHANGE UP (ref 80–100)
NRBC # BLD: 0 /100 WBCS — SIGNIFICANT CHANGE UP (ref 0–0)
PLATELET # BLD AUTO: 277 K/UL — SIGNIFICANT CHANGE UP (ref 150–400)
POTASSIUM SERPL-MCNC: 4.2 MMOL/L — SIGNIFICANT CHANGE UP (ref 3.5–5.3)
POTASSIUM SERPL-SCNC: 4.2 MMOL/L — SIGNIFICANT CHANGE UP (ref 3.5–5.3)
PROT SERPL-MCNC: 6.8 G/DL — SIGNIFICANT CHANGE UP (ref 6–8.3)
RBC # BLD: 4.36 M/UL — SIGNIFICANT CHANGE UP (ref 3.8–5.2)
RBC # FLD: 15 % — HIGH (ref 10.3–14.5)
SODIUM SERPL-SCNC: 144 MMOL/L — SIGNIFICANT CHANGE UP (ref 135–145)
WBC # BLD: 6.77 K/UL — SIGNIFICANT CHANGE UP (ref 3.8–10.5)
WBC # FLD AUTO: 6.77 K/UL — SIGNIFICANT CHANGE UP (ref 3.8–10.5)

## 2022-07-22 PROCEDURE — 85027 COMPLETE CBC AUTOMATED: CPT

## 2022-07-22 PROCEDURE — 36415 COLL VENOUS BLD VENIPUNCTURE: CPT

## 2022-07-22 PROCEDURE — 83036 HEMOGLOBIN GLYCOSYLATED A1C: CPT

## 2022-07-22 PROCEDURE — 80053 COMPREHEN METABOLIC PANEL: CPT

## 2022-07-22 PROCEDURE — G0463: CPT

## 2022-07-22 RX ORDER — METFORMIN HYDROCHLORIDE 850 MG/1
1 TABLET ORAL
Qty: 0 | Refills: 0 | DISCHARGE

## 2022-07-22 RX ORDER — SEMAGLUTIDE 0.68 MG/ML
1 INJECTION, SOLUTION SUBCUTANEOUS
Qty: 0 | Refills: 0 | DISCHARGE

## 2022-07-22 RX ORDER — ATORVASTATIN CALCIUM 80 MG/1
1 TABLET, FILM COATED ORAL
Qty: 0 | Refills: 0 | DISCHARGE

## 2022-07-22 RX ORDER — INSULIN LISPRO 100/ML
15 VIAL (ML) SUBCUTANEOUS
Qty: 0 | Refills: 0 | DISCHARGE

## 2022-07-22 RX ORDER — GABAPENTIN 400 MG/1
1 CAPSULE ORAL
Qty: 0 | Refills: 0 | DISCHARGE

## 2022-07-22 RX ORDER — INSULIN LISPRO 100/ML
14 VIAL (ML) SUBCUTANEOUS
Qty: 0 | Refills: 0 | DISCHARGE

## 2022-07-22 RX ORDER — LISINOPRIL 2.5 MG/1
1 TABLET ORAL
Qty: 0 | Refills: 0 | DISCHARGE

## 2022-07-22 RX ORDER — INSULIN GLARGINE 100 [IU]/ML
50 INJECTION, SOLUTION SUBCUTANEOUS
Qty: 0 | Refills: 0 | DISCHARGE

## 2022-07-22 RX ORDER — INSULIN GLARGINE 100 [IU]/ML
35 INJECTION, SOLUTION SUBCUTANEOUS
Qty: 0 | Refills: 0 | DISCHARGE

## 2022-07-22 NOTE — H&P PST ADULT - NSICDXPASTSURGICALHX_GEN_ALL_CORE_FT
PAST SURGICAL HISTORY:  H/O Achilles tendon repair right, 2001    H/O colonoscopy     History of arthroscopy of left shoulder 2014 & 2015

## 2022-07-22 NOTE — H&P PST ADULT - HISTORY OF PRESENT ILLNESS
57 yo F with h/o Obesity , T2 DM c/o bilateral shoulder pain ,recurrent breast abscess & hypertrophy of breast x 10 years. Pt had surgical consult- scheduled for bilateral breast reduction on 8/1/22  **Pt denies any fever, chills, recent travel or sick contacts  **Covid 19 PCR to be scheduled   55 yo F with h/o Obesity (BMI 36) , T2 DM, HTN c/o bilateral shoulder pain ,recurrent breast abscess & hypertrophy of breast x 10 years. Pt had surgical consult- scheduled for bilateral breast reduction on 8/1/22  **Pt denies any fever, chills, recent travel or sick contacts  **Covid 19 PCR to be scheduled

## 2022-07-22 NOTE — H&P PST ADULT - FALL HARM RISK - UNIVERSAL INTERVENTIONS
Bed in lowest position, wheels locked, appropriate side rails in place/Instruct patient to call for assistance before getting out of bed or chair/Non-slip footwear when patient is out of bed/[place] to call system/Physically safe environment - no spills, clutter or unnecessary equipment/Purposeful Proactive Rounding/Room/bathroom lighting operational, light cord in reach

## 2022-07-22 NOTE — H&P PST ADULT - PROBLEM SELECTOR PLAN 1
Bilateral breast reduction-Excision/local tissue rearrangement of posterior neck scar  Labs- CBC, CMP, Hb A1C  Pre op instructions discussed, pt verbalized understanding  Pre op cardiology evaluation

## 2022-07-22 NOTE — H&P PST ADULT - NSICDXPASTMEDICALHX_GEN_ALL_CORE_FT
PAST MEDICAL HISTORY:  Achilles tendon injury right 2001    ADHD (attention deficit hyperactivity disorder)     H/O tear of meniscus of knee joint left    HLD (hyperlipidemia)     Obesity (BMI 30-39.9)     Type 2 diabetes mellitus      PAST MEDICAL HISTORY:  Achilles tendon injury right 2001    ADHD (attention deficit hyperactivity disorder)     Benign essential HTN     H/O tear of meniscus of knee joint left    HLD (hyperlipidemia)     Hypertrophy of breast     Obesity (BMI 30-39.9) BMI 36    JACKIE (obstructive sleep apnea) by symptoms    Scar condition and fibrosis of skin posterior neck 2/2 infected wound    Type 2 diabetes mellitus

## 2022-07-24 LAB
A1C WITH ESTIMATED AVERAGE GLUCOSE RESULT: 10 % — HIGH (ref 4–5.6)
ESTIMATED AVERAGE GLUCOSE: 240 MG/DL — HIGH (ref 68–114)

## 2022-09-07 NOTE — H&P ADULT - NSHPLABSRESULTS_GEN_ALL_CORE
14.0   8.94   )----------(  258       ( 29 Jun 2019 22:28 )               42.5      138    |  101    |  11.0   ----------------------------<  225        ( 29 Jun 2019 22:28 )  4.3     |  26.0   |  0.77     Ca    9.6        ( 29 Jun 2019 22:28 )    TPro  7.0    /  Alb  4.1    /  TBili  0.2    /  DBili  x      /  AST  22     /  ALT  30     /  AlkPhos  98     ( 29 Jun 2019 22:28 )    LIVER FUNCTIONS - ( 29 Jun 2019 22:28 )  Alb: 4.1 g/dL / Pro: 7.0 g/dL / ALK PHOS: 98 U/L / ALT: 30 U/L / AST: 22 U/L / GGT: x           CAPILLARY BLOOD GLUCOSE    ct neck -   Two peripherally enhancing collections measuring 2.0 x 2.9 x 3.5 cm and   2.4 x 1.3 x 1.0 cm in the posterior midline neck, and right of midline,   respectively, at the level of C2-C3. Overlying skin thickening and   surrounding inflammatory change. c/o left ankle swelling for the past 3 weeks, reports sustained an abrasion while rolling a metal gate, seen at urgent care with no significant filings, not taking abx.  hx of HTN on lisinopril. swelling noted to the affected extremity, no skin discoloration

## 2022-10-25 ENCOUNTER — EMERGENCY (EMERGENCY)
Facility: HOSPITAL | Age: 57
LOS: 1 days | Discharge: HOME | End: 2022-10-25
Attending: EMERGENCY MEDICINE
Payer: SELF-PAY

## 2022-10-25 VITALS
RESPIRATION RATE: 18 BRPM | SYSTOLIC BLOOD PRESSURE: 110 MMHG | TEMPERATURE: 98 F | HEIGHT: 66 IN | OXYGEN SATURATION: 98 % | HEART RATE: 70 BPM | DIASTOLIC BLOOD PRESSURE: 76 MMHG | WEIGHT: 222.01 LBS

## 2022-10-25 DIAGNOSIS — Z98.89 OTHER SPECIFIED POSTPROCEDURAL STATES: Chronic | ICD-10-CM

## 2022-10-25 PROBLEM — N62 HYPERTROPHY OF BREAST: Chronic | Status: ACTIVE | Noted: 2022-07-22

## 2022-10-25 PROBLEM — E11.9 TYPE 2 DIABETES MELLITUS WITHOUT COMPLICATIONS: Chronic | Status: ACTIVE | Noted: 2022-07-22

## 2022-10-25 PROBLEM — I10 ESSENTIAL (PRIMARY) HYPERTENSION: Chronic | Status: ACTIVE | Noted: 2022-07-22

## 2022-10-25 PROBLEM — L90.5 SCAR CONDITIONS AND FIBROSIS OF SKIN: Chronic | Status: ACTIVE | Noted: 2022-07-22

## 2022-10-25 PROBLEM — E78.5 HYPERLIPIDEMIA, UNSPECIFIED: Chronic | Status: ACTIVE | Noted: 2022-07-22

## 2022-10-25 PROBLEM — G47.33 OBSTRUCTIVE SLEEP APNEA (ADULT) (PEDIATRIC): Chronic | Status: ACTIVE | Noted: 2022-07-22

## 2022-10-25 PROCEDURE — 72110 X-RAY EXAM L-2 SPINE 4/>VWS: CPT | Mod: 26

## 2022-10-25 PROCEDURE — 99284 EMERGENCY DEPT VISIT MOD MDM: CPT

## 2022-10-25 RX ORDER — METHOCARBAMOL 500 MG/1
1500 TABLET, FILM COATED ORAL ONCE
Refills: 0 | Status: COMPLETED | OUTPATIENT
Start: 2022-10-25 | End: 2022-10-25

## 2022-10-25 RX ORDER — METHOCARBAMOL 500 MG/1
2 TABLET, FILM COATED ORAL
Qty: 24 | Refills: 0
Start: 2022-10-25 | End: 2022-10-28

## 2022-10-25 RX ORDER — LIDOCAINE 4 G/100G
1 CREAM TOPICAL ONCE
Refills: 0 | Status: COMPLETED | OUTPATIENT
Start: 2022-10-25 | End: 2022-10-25

## 2022-10-25 RX ORDER — ACETAMINOPHEN 500 MG
975 TABLET ORAL ONCE
Refills: 0 | Status: COMPLETED | OUTPATIENT
Start: 2022-10-25 | End: 2022-10-25

## 2022-10-25 RX ORDER — LIDOCAINE 4 G/100G
1 CREAM TOPICAL
Qty: 7 | Refills: 0
Start: 2022-10-25 | End: 2022-10-31

## 2022-10-25 RX ORDER — IBUPROFEN 200 MG
1 TABLET ORAL
Qty: 28 | Refills: 0
Start: 2022-10-25 | End: 2022-10-31

## 2022-10-25 RX ADMIN — Medication 975 MILLIGRAM(S): at 11:29

## 2022-10-25 RX ADMIN — LIDOCAINE 1 PATCH: 4 CREAM TOPICAL at 11:29

## 2022-10-25 RX ADMIN — METHOCARBAMOL 1500 MILLIGRAM(S): 500 TABLET, FILM COATED ORAL at 11:29
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
## 2022-10-25 NOTE — ED PROVIDER NOTE - CARE PROVIDER_API CALL
Jordana Bolton)  Neurosurgery  [address] [place] [address]  Phone: [phone]  Fax: [phone]  Follow Up Time:

## 2022-10-25 NOTE — ED PROVIDER NOTE - PROGRESS NOTE DETAILS
Pt advised of results and will have f/u with spine specialist. Will send prescription for muscle relaxers.

## 2022-10-25 NOTE — ED PROVIDER NOTE - NSFOLLOWUPCLINICS_GEN_ALL_ED_FT
[facility] Spine - [facility]  Ortho/Spine  [address] [place] [address]  Phone: [phone]  Fax:

## 2022-10-25 NOTE — ED PROVIDER NOTE - NS ED ATTENDING STATEMENT MOD
This was a shared visit with the LAVERNE. I reviewed and verified the documentation and independently performed the documented:

## 2022-10-25 NOTE — ED PROVIDER NOTE - NSICDXPASTMEDICALHX_GEN_ALL_CORE_FT
PAST MEDICAL HISTORY:  Achilles tendon injury right 2001    ADHD (attention deficit hyperactivity disorder)     Benign essential HTN     H/O tear of meniscus of knee joint left    HLD (hyperlipidemia)     Hypertrophy of breast     Obesity (BMI 30-39.9) BMI 36    JACKIE (obstructive sleep apnea) by symptoms    Scar condition and fibrosis of skin posterior neck 2/2 infected wound    Type 2 diabetes mellitus

## 2022-10-25 NOTE — ED PROVIDER NOTE - PATIENT PORTAL LINK FT
You can access the FollowMyHealth Patient Portal offered by [facility] by registering at the following website: [email or web address]. By joining [employer]’s FollowMyHealth portal, you will also be able to view your health information using other applications (apps) compatible with our system.

## 2022-10-26 PROCEDURE — 72110 X-RAY EXAM L-2 SPINE 4/>VWS: CPT

## 2022-10-26 PROCEDURE — 99283 EMERGENCY DEPT VISIT LOW MDM: CPT

## 2022-10-26 PROCEDURE — 82962 GLUCOSE BLOOD TEST: CPT

## 2024-01-20 ENCOUNTER — EMERGENCY (EMERGENCY)
Facility: HOSPITAL | Age: 59
LOS: 1 days | Discharge: HOME | End: 2024-01-20
Attending: EMERGENCY MEDICINE
Payer: COMMERCIAL

## 2024-01-20 VITALS
WEIGHT: 223.33 LBS | SYSTOLIC BLOOD PRESSURE: 129 MMHG | HEART RATE: 67 BPM | TEMPERATURE: 98 F | RESPIRATION RATE: 18 BRPM | HEIGHT: 67 IN | DIASTOLIC BLOOD PRESSURE: 76 MMHG | OXYGEN SATURATION: 97 %

## 2024-01-20 DIAGNOSIS — Z98.89 OTHER SPECIFIED POSTPROCEDURAL STATES: Chronic | ICD-10-CM

## 2024-01-20 LAB
ALBUMIN SERPL ELPH-MCNC: 4.1 G/DL — SIGNIFICANT CHANGE UP (ref 3.3–5.2)
ALP SERPL-CCNC: 82 U/L — SIGNIFICANT CHANGE UP (ref 40–120)
ALT FLD-CCNC: 50 U/L — HIGH
ANION GAP SERPL CALC-SCNC: 16 MMOL/L — SIGNIFICANT CHANGE UP (ref 5–17)
AST SERPL-CCNC: 33 U/L — HIGH
BASOPHILS # BLD AUTO: 0.02 K/UL — SIGNIFICANT CHANGE UP (ref 0–0.2)
BASOPHILS NFR BLD AUTO: 0.4 % — SIGNIFICANT CHANGE UP (ref 0–2)
BILIRUB SERPL-MCNC: <0.2 MG/DL — LOW (ref 0.4–2)
BUN SERPL-MCNC: 12.3 MG/DL — SIGNIFICANT CHANGE UP (ref 8–20)
CALCIUM SERPL-MCNC: 9.2 MG/DL — SIGNIFICANT CHANGE UP (ref 8.4–10.5)
CHLORIDE SERPL-SCNC: 102 MMOL/L — SIGNIFICANT CHANGE UP (ref 96–108)
CO2 SERPL-SCNC: 23 MMOL/L — SIGNIFICANT CHANGE UP (ref 22–29)
CREAT SERPL-MCNC: 1.04 MG/DL — SIGNIFICANT CHANGE UP (ref 0.5–1.3)
EGFR: 62 ML/MIN/1.73M2 — SIGNIFICANT CHANGE UP
EOSINOPHIL # BLD AUTO: 0.09 K/UL — SIGNIFICANT CHANGE UP (ref 0–0.5)
EOSINOPHIL NFR BLD AUTO: 1.7 % — SIGNIFICANT CHANGE UP (ref 0–6)
FLUAV H1 2009 PAND RNA SPEC QL NAA+PROBE: DETECTED
GLUCOSE SERPL-MCNC: 323 MG/DL — HIGH (ref 70–99)
HCT VFR BLD CALC: 44.1 % — SIGNIFICANT CHANGE UP (ref 34.5–45)
HGB BLD-MCNC: 14.6 G/DL — SIGNIFICANT CHANGE UP (ref 11.5–15.5)
IMM GRANULOCYTES NFR BLD AUTO: 0.2 % — SIGNIFICANT CHANGE UP (ref 0–0.9)
LYMPHOCYTES # BLD AUTO: 2.41 K/UL — SIGNIFICANT CHANGE UP (ref 1–3.3)
LYMPHOCYTES # BLD AUTO: 46.4 % — HIGH (ref 13–44)
MCHC RBC-ENTMCNC: 31.1 PG — SIGNIFICANT CHANGE UP (ref 27–34)
MCHC RBC-ENTMCNC: 33.1 GM/DL — SIGNIFICANT CHANGE UP (ref 32–36)
MCV RBC AUTO: 94 FL — SIGNIFICANT CHANGE UP (ref 80–100)
MONOCYTES # BLD AUTO: 0.44 K/UL — SIGNIFICANT CHANGE UP (ref 0–0.9)
MONOCYTES NFR BLD AUTO: 8.5 % — SIGNIFICANT CHANGE UP (ref 2–14)
NEUTROPHILS # BLD AUTO: 2.22 K/UL — SIGNIFICANT CHANGE UP (ref 1.8–7.4)
NEUTROPHILS NFR BLD AUTO: 42.8 % — LOW (ref 43–77)
PLATELET # BLD AUTO: 237 K/UL — SIGNIFICANT CHANGE UP (ref 150–400)
POTASSIUM SERPL-MCNC: 4.1 MMOL/L — SIGNIFICANT CHANGE UP (ref 3.5–5.3)
POTASSIUM SERPL-SCNC: 4.1 MMOL/L — SIGNIFICANT CHANGE UP (ref 3.5–5.3)
PROT SERPL-MCNC: 6.5 G/DL — LOW (ref 6.6–8.7)
RAPID RVP RESULT: DETECTED
RBC # BLD: 4.69 M/UL — SIGNIFICANT CHANGE UP (ref 3.8–5.2)
RBC # FLD: 14.2 % — SIGNIFICANT CHANGE UP (ref 10.3–14.5)
SARS-COV-2 RNA SPEC QL NAA+PROBE: SIGNIFICANT CHANGE UP
SODIUM SERPL-SCNC: 140 MMOL/L — SIGNIFICANT CHANGE UP (ref 135–145)
WBC # BLD: 5.19 K/UL — SIGNIFICANT CHANGE UP (ref 3.8–10.5)
WBC # FLD AUTO: 5.19 K/UL — SIGNIFICANT CHANGE UP (ref 3.8–10.5)

## 2024-01-20 PROCEDURE — 71045 X-RAY EXAM CHEST 1 VIEW: CPT

## 2024-01-20 PROCEDURE — 96374 THER/PROPH/DIAG INJ IV PUSH: CPT

## 2024-01-20 PROCEDURE — 36415 COLL VENOUS BLD VENIPUNCTURE: CPT

## 2024-01-20 PROCEDURE — 85025 COMPLETE CBC W/AUTO DIFF WBC: CPT

## 2024-01-20 PROCEDURE — 0225U NFCT DS DNA&RNA 21 SARSCOV2: CPT

## 2024-01-20 PROCEDURE — 93010 ELECTROCARDIOGRAM REPORT: CPT

## 2024-01-20 PROCEDURE — 71045 X-RAY EXAM CHEST 1 VIEW: CPT | Mod: 26

## 2024-01-20 PROCEDURE — 99284 EMERGENCY DEPT VISIT MOD MDM: CPT

## 2024-01-20 PROCEDURE — 96375 TX/PRO/DX INJ NEW DRUG ADDON: CPT

## 2024-01-20 PROCEDURE — 93005 ELECTROCARDIOGRAM TRACING: CPT

## 2024-01-20 PROCEDURE — 80053 COMPREHEN METABOLIC PANEL: CPT

## 2024-01-20 PROCEDURE — 99285 EMERGENCY DEPT VISIT HI MDM: CPT | Mod: 25

## 2024-01-20 RX ORDER — SODIUM CHLORIDE 9 MG/ML
1000 INJECTION INTRAMUSCULAR; INTRAVENOUS; SUBCUTANEOUS ONCE
Refills: 0 | Status: COMPLETED | OUTPATIENT
Start: 2024-01-20 | End: 2024-01-20

## 2024-01-20 RX ORDER — KETOROLAC TROMETHAMINE 30 MG/ML
15 SYRINGE (ML) INJECTION ONCE
Refills: 0 | Status: DISCONTINUED | OUTPATIENT
Start: 2024-01-20 | End: 2024-01-20

## 2024-01-20 RX ORDER — ACETAMINOPHEN 500 MG
1000 TABLET ORAL ONCE
Refills: 0 | Status: COMPLETED | OUTPATIENT
Start: 2024-01-20 | End: 2024-01-20

## 2024-01-20 RX ADMIN — SODIUM CHLORIDE 1000 MILLILITER(S): 9 INJECTION INTRAMUSCULAR; INTRAVENOUS; SUBCUTANEOUS at 10:39

## 2024-01-20 RX ADMIN — Medication 400 MILLIGRAM(S): at 10:39

## 2024-01-20 RX ADMIN — Medication 15 MILLIGRAM(S): at 10:39

## 2024-01-20 NOTE — ED ADULT NURSE NOTE - NSFALLUNIVINTERV_ED_ALL_ED
Bed/Stretcher in lowest position, wheels locked, appropriate side rails in place/Call bell, personal items and telephone in reach/Instruct patient to call for assistance before getting out of bed/chair/stretcher/Non-slip footwear applied when patient is off stretcher/[place] to call system/Physically safe environment - no spills, clutter or unnecessary equipment/Purposeful proactive rounding/Room/bathroom lighting operational, light cord in reach

## 2024-01-20 NOTE — ED PROVIDER NOTE - NSFOLLOWUPINSTRUCTIONS_ED_ALL_ED_FT
- You may take Tylenol extra strength 2 tablets every 6 hours or Ibuprofen 600mg (3 tablets) every 6 hours as needed for aches, pains.   - Stay well hydrated  - Please follow up with your primary care provider in the next 3-4 days   - Return to the emergency department for concerning symptoms

## 2024-01-20 NOTE — ED PROVIDER NOTE - ATTENDING APP SHARED VISIT CONTRIBUTION OF CARE
I, Farshad Trotter MD, performed the initial face to face bedside interview with this patient regarding history of present illness, review of symptoms and relevant past medical, social and family history.  I completed an independent physical examination.  I was the initial provider who evaluated this patient. I have signed out the follow up of any pending tests (i.e. labs, radiological studies) to the ACP.  I have communicated the patient’s plan of care and disposition with the ACP.

## 2024-01-20 NOTE — ED PROVIDER NOTE - PATIENT PORTAL LINK FT
You can access the FollowMyHealth Patient Portal offered by [facility] by registering at the following website: [email or web address]. By joining [employer]’s FollowMyHealth portal, you will also be able to view your health information using other applications (apps) compatible with our system.

## 2024-01-20 NOTE — ED PROVIDER NOTE - CLINICAL SUMMARY MEDICAL DECISION MAKING FREE TEXT BOX
58 year old female presenting for 6 days of flu like illness, flu+ 2 days ago at urgent care. HDS, normoxic, speaking in full sentences. Plan to treat symptomatically, check labs, chest x-ray, EKG, reassess.

## 2024-01-20 NOTE — ED PROVIDER NOTE - PROGRESS NOTE DETAILS
Aure Ewing, DO: patient feeling slightly improved after treatment in ED. Labs reviewed, do not require emergent intervention. No acute findings on chest x-ray. Sinus reynaldo on EKG, no evidence of ischemia. Discussed results with patient. Supportive care advised. Stable for dc home to f/u with PCP.

## 2024-01-20 NOTE — ED PROVIDER NOTE - OBJECTIVE STATEMENT
58 year old female with no significant PMHx presenting for evaluation of flu like symptoms. Patient states symptoms of dry cough and malaise started 6 days ago, seen at urgent care 2 days ago, diagnosed with flu, prescribed tessalon perles, advised to go to ED for worsening symptoms. Patient states she does not feel better so came today. Reports fever 102 yesterday, improved with tylenol, afebrile today. Reports shortness of breath and chest pain only with coughing. Denies nausea, vomiting diarrhea, anorexia.     SHx- smoker since 12  years of age, <1ppd

## 2024-01-20 NOTE — ED ADULT TRIAGE NOTE - CHIEF COMPLAINT QUOTE
pt states she has the Flu, DX Thursday c/o cough & SOB, was told by urgent care if worse to go to ED  A&Ox3, resp wnl, 102 fever yesterday

## 2024-01-20 NOTE — ED ADULT NURSE NOTE - OBJECTIVE STATEMENT
Assumed care of pt at 0959 in . Pt A&Ox4 c/o flu. Pt state she went to urgent care Tuesday for flu like symptoms was diagnosed with flu and sent home. pt states she started feeling worse and call urgent care and was told to come to ED. Pt shows no s/s of distress RR even and unlabored

## 2024-01-20 NOTE — ED PROVIDER NOTE - PHYSICAL EXAMINATION
Gen: well appearing, no acute distress. +dry cough heard on exam  Head: normocephalic, atraumatic  EENT: EOMI, moist mucous membranes, no scleral icterus, no JVD  Lung: no increased work of breathing, clear to auscultation bilaterally, no wheezing, rales, rhonchi, speaking in full sentences  CV: regular rate, regular rhythm, normal s1/s2  Abd: soft, non-tender, non-distended,  no CVA tenderness  MSK: No edema, no visible deformities, full range of motion in all 4 extremities  Neuro: Awake, alert, no focal neurologic deficits  Skin: No obvious rash, no jaundice  Psych: normal affect, normal speech

## 2024-03-17 NOTE — CONSULT NOTE ADULT - CONSULT REQUESTED DATE/TIME
30-Jun-2019 17:19 HPI: 62-year-old female past medical history hypertension, on aspirin, distant L1 fracture with chronic arthritis presenting with a fall.  Patient was in the kitchen walking and slipped this morning, fell onto her lower back, did not hit her head did not lose consciousness denies any prodromal symptoms including lightheadedness, nausea vomiting chest pain shortness of breath palpitations.  She was alone when the fall occurred, is able to recall the fall, daughter heard her fall and immediately came to help her, she was unable to stand or walk after the fall, EMS was called.  At this time patient says she only has severe back pain and some abdominal pain, is able to move her arms and legs and has no numbness or tingling in the extremities.  Denies any increase or change in her blood pressure medication regimen recently.  She has had a cold for the last few days otherwise no recent symptoms, no vomiting or diarrhea and has been eating normally.    PAST MEDICAL & SURGICAL HISTORY:  HLD (hyperlipidemia)      Hypertension      No significant past surgical history        Allergies    No Known Allergies    Intolerances        SOCIAL HISTORY:  FAMILY HISTORY:    Vital Signs Last 24 Hrs  T(C): 36.6 (17 Mar 2024 13:11), Max: 36.6 (17 Mar 2024 08:51)  T(F): 97.9 (17 Mar 2024 13:11), Max: 97.9 (17 Mar 2024 08:51)  HR: 79 (17 Mar 2024 13:11) (79 - 90)  BP: 156/86 (17 Mar 2024 13:11) (149/82 - 167/99)  BP(mean): --  RR: 18 (17 Mar 2024 13:11) (18 - 18)  SpO2: 100% (17 Mar 2024 13:11) (99% - 100%)    Parameters below as of 17 Mar 2024 13:11  Patient On (Oxygen Delivery Method): room air        PHYSICAL EXAM:  Awake Alert Attentive Affect appropriate Ox3 Cait speaking, daughter at bedside provided translation  PERRL EOMI  Tone: normal.     TTP OF Upper Lumbar area               Strength:     [X] Upper extremity                      Delt       Bicep    Tricep                                                  R         5/5        5/5        5/5       5/5                                               L          5/5        5/5        5/5       5/5  [X] Lower extremity                       HF          KE          KF        DF         PF    EHL                                               R        5/5        5/5        5/5       5/5       5/5      5/5                                               L         5/5        5/5       5/5       5/5        5/5       5/5  Sensory Intact to Light Touch  Reflexes WNL, No clonus, Toes down going    LABS:                          11.3   5.82  )-----------( 175      ( 17 Mar 2024 10:39 )             34.2     03-17    138  |  102  |  10  ----------------------------<  85  3.5   |  27  |  0.65    Ca    9.7      17 Mar 2024 10:39  Phos  3.6     03-17  Mg     2.10     03-17    TPro  7.8  /  Alb  4.3  /  TBili  0.3  /  DBili  x   /  AST  24  /  ALT  25  /  AlkPhos  67  03-17      Urinalysis Basic - ( 17 Mar 2024 10:39 )    Color: x / Appearance: x / SG: x / pH: x  Gluc: 85 mg/dL / Ketone: x  / Bili: x / Urobili: x   Blood: x / Protein: x / Nitrite: x   Leuk Esterase: x / RBC: x / WBC x   Sq Epi: x / Non Sq Epi: x / Bacteria: x        RADIOLOGY & ADDITIONAL STUDIES:    < from: CT Lumbar Spine Reform w/ IV Cont (03.17.24 @ 10:14) >  L2-L3: Disc bulge with mild indentation of the ventral thecal sac and   mild narrowing of the bilateral neural foramina.    L3-L4: Disc bulge with mild narrowing of the bilateral neural foramina.   There is mild central canal stenosis.    L4-L5: Mild disc bulge. There is mild central canal or neural foraminal   narrowing.    L5-S1: No significant disc bulge or focal disc herniation. No significant   spinal canal stenosis or neuroforaminal narrowing. Moderate LEFT facet   osteophytic hypertrophy is noted.    MISCELLANEOUS:Distended bladder.      IMPRESSION:    CT THORACIC SPINE:  No acute fracture or traumatic subluxation.  Mild multilevel degenerative changes.    CT LUMBAR SPINE:  Acute compression fracture of the superior endplate of L2 L2 with loss of   10% of vertebral body height. Chronic compression deformity of L1 with   loss of 30% of vertebral body height and minimal bony retropulsion.[Mild   multilevel degenerative changes as described above.    < end of copied text >

## 2024-03-28 NOTE — ED PROVIDER NOTE - ATTENDING CONTRIBUTION TO CARE
[FreeTextEntry1] : formerly a pt of Dr Tan  Ophtho- Dr Coh PCP- Dr Clayton, [place] [facility] Dr Brayden Marquis colonoscopy 2016 Neuro-  Dr Campbell @ [facility]  ? hydrocephalus LEONARDO- Dr Yu - Dr Quevedo
I, Farshad Trotter MD, performed the initial face to face bedside interview with this patient regarding history of present illness, review of symptoms and relevant past medical, social and family history.  I completed an independent physical examination.  I was the initial provider who evaluated this patient. I have signed out the follow up of any pending tests (i.e. labs, radiological studies) to the resident.  I have communicated the patient’s plan of care and disposition with the resident.

## 2024-11-06 ENCOUNTER — EMERGENCY (EMERGENCY)
Facility: HOSPITAL | Age: 59
LOS: 1 days | End: 2024-11-06
Attending: EMERGENCY MEDICINE
Payer: COMMERCIAL

## 2024-11-06 VITALS
WEIGHT: 225.09 LBS | HEART RATE: 76 BPM | OXYGEN SATURATION: 99 % | RESPIRATION RATE: 18 BRPM | DIASTOLIC BLOOD PRESSURE: 85 MMHG | SYSTOLIC BLOOD PRESSURE: 139 MMHG | TEMPERATURE: 98 F

## 2024-11-06 VITALS
HEART RATE: 70 BPM | OXYGEN SATURATION: 98 % | RESPIRATION RATE: 20 BRPM | DIASTOLIC BLOOD PRESSURE: 89 MMHG | TEMPERATURE: 98 F | SYSTOLIC BLOOD PRESSURE: 153 MMHG

## 2024-11-06 DIAGNOSIS — Z98.89 OTHER SPECIFIED POSTPROCEDURAL STATES: Chronic | ICD-10-CM

## 2024-11-06 LAB
ACETONE SERPL-MCNC: NEGATIVE — SIGNIFICANT CHANGE UP
ALBUMIN SERPL ELPH-MCNC: 4.3 G/DL — SIGNIFICANT CHANGE UP (ref 3.3–5.2)
ALP SERPL-CCNC: 106 U/L — SIGNIFICANT CHANGE UP (ref 40–120)
ALT FLD-CCNC: 40 U/L — HIGH
ANION GAP SERPL CALC-SCNC: 10 MMOL/L — SIGNIFICANT CHANGE UP (ref 5–17)
AST SERPL-CCNC: 28 U/L — SIGNIFICANT CHANGE UP
BASE EXCESS BLDV CALC-SCNC: 6 MMOL/L — HIGH (ref -2–3)
BASOPHILS # BLD AUTO: 0.04 K/UL — SIGNIFICANT CHANGE UP (ref 0–0.2)
BASOPHILS NFR BLD AUTO: 0.5 % — SIGNIFICANT CHANGE UP (ref 0–2)
BILIRUB SERPL-MCNC: 0.3 MG/DL — LOW (ref 0.4–2)
BUN SERPL-MCNC: 13.1 MG/DL — SIGNIFICANT CHANGE UP (ref 8–20)
CALCIUM SERPL-MCNC: 10 MG/DL — SIGNIFICANT CHANGE UP (ref 8.4–10.5)
CHLORIDE SERPL-SCNC: 102 MMOL/L — SIGNIFICANT CHANGE UP (ref 96–108)
CO2 SERPL-SCNC: 28 MMOL/L — SIGNIFICANT CHANGE UP (ref 22–29)
CREAT SERPL-MCNC: 0.95 MG/DL — SIGNIFICANT CHANGE UP (ref 0.5–1.3)
EGFR: 69 ML/MIN/1.73M2 — SIGNIFICANT CHANGE UP
EOSINOPHIL # BLD AUTO: 0.29 K/UL — SIGNIFICANT CHANGE UP (ref 0–0.5)
EOSINOPHIL NFR BLD AUTO: 3.5 % — SIGNIFICANT CHANGE UP (ref 0–6)
GLUCOSE SERPL-MCNC: 339 MG/DL — HIGH (ref 70–99)
HCO3 BLDV-SCNC: 32 MMOL/L — HIGH (ref 22–29)
HCT VFR BLD CALC: 47 % — HIGH (ref 34.5–45)
HGB BLD-MCNC: 15.6 G/DL — HIGH (ref 11.5–15.5)
IMM GRANULOCYTES NFR BLD AUTO: 0.2 % — SIGNIFICANT CHANGE UP (ref 0–0.9)
LYMPHOCYTES # BLD AUTO: 3.51 K/UL — HIGH (ref 1–3.3)
LYMPHOCYTES # BLD AUTO: 42.4 % — SIGNIFICANT CHANGE UP (ref 13–44)
MCHC RBC-ENTMCNC: 31.2 PG — SIGNIFICANT CHANGE UP (ref 27–34)
MCHC RBC-ENTMCNC: 33.2 G/DL — SIGNIFICANT CHANGE UP (ref 32–36)
MCV RBC AUTO: 94 FL — SIGNIFICANT CHANGE UP (ref 80–100)
MONOCYTES # BLD AUTO: 0.55 K/UL — SIGNIFICANT CHANGE UP (ref 0–0.9)
MONOCYTES NFR BLD AUTO: 6.7 % — SIGNIFICANT CHANGE UP (ref 2–14)
NEUTROPHILS # BLD AUTO: 3.86 K/UL — SIGNIFICANT CHANGE UP (ref 1.8–7.4)
NEUTROPHILS NFR BLD AUTO: 46.7 % — SIGNIFICANT CHANGE UP (ref 43–77)
PCO2 BLDV: 59 MMHG — HIGH (ref 39–42)
PH BLDV: 7.34 — SIGNIFICANT CHANGE UP (ref 7.32–7.43)
PLATELET # BLD AUTO: 227 K/UL — SIGNIFICANT CHANGE UP (ref 150–400)
PO2 BLDV: 38 MMHG — SIGNIFICANT CHANGE UP (ref 25–45)
POTASSIUM SERPL-MCNC: 5.1 MMOL/L — SIGNIFICANT CHANGE UP (ref 3.5–5.3)
POTASSIUM SERPL-SCNC: 5.1 MMOL/L — SIGNIFICANT CHANGE UP (ref 3.5–5.3)
PROT SERPL-MCNC: 7 G/DL — SIGNIFICANT CHANGE UP (ref 6.6–8.7)
RBC # BLD: 5 M/UL — SIGNIFICANT CHANGE UP (ref 3.8–5.2)
RBC # FLD: 13.8 % — SIGNIFICANT CHANGE UP (ref 10.3–14.5)
SAO2 % BLDV: 68 % — SIGNIFICANT CHANGE UP
SODIUM SERPL-SCNC: 139 MMOL/L — SIGNIFICANT CHANGE UP (ref 135–145)
WBC # BLD: 8.27 K/UL — SIGNIFICANT CHANGE UP (ref 3.8–10.5)
WBC # FLD AUTO: 8.27 K/UL — SIGNIFICANT CHANGE UP (ref 3.8–10.5)

## 2024-11-06 PROCEDURE — 99284 EMERGENCY DEPT VISIT MOD MDM: CPT

## 2024-11-06 RX ORDER — INSULIN GLARGINE,HUM.REC.ANLOG 100/ML
35 VIAL (ML) SUBCUTANEOUS ONCE
Refills: 0 | Status: COMPLETED | OUTPATIENT
Start: 2024-11-06 | End: 2024-11-06

## 2024-11-06 RX ORDER — DOXYCYCLINE HYCLATE 100 MG/1
100 TABLET, FILM COATED ORAL ONCE
Refills: 0 | Status: COMPLETED | OUTPATIENT
Start: 2024-11-06 | End: 2024-11-06

## 2024-11-06 RX ORDER — INSULIN LISPRO 100/ML
10 VIAL (ML) SUBCUTANEOUS ONCE
Refills: 0 | Status: COMPLETED | OUTPATIENT
Start: 2024-11-06 | End: 2024-11-06

## 2024-11-06 RX ADMIN — Medication 10 UNIT(S): at 21:33

## 2024-11-06 RX ADMIN — DOXYCYCLINE HYCLATE 100 MILLIGRAM(S): 100 TABLET, FILM COATED ORAL at 19:41

## 2024-11-06 RX ADMIN — Medication 1000 MILLILITER(S): at 19:41

## 2024-11-06 RX ADMIN — Medication 1000 MILLILITER(S): at 21:33

## 2024-11-06 RX ADMIN — Medication 35 UNIT(S): at 23:18

## 2024-11-06 NOTE — ED ADULT NURSE NOTE - OBJECTIVE STATEMENT
Assumed care of pt at 1930 Pt A&Ox4 c/o having low BG and feeling weak and slow and lethargic. Resp even and unlabored on RA, abd soft nontedner, denes n/v/d.

## 2024-11-06 NOTE — ED ADULT TRIAGE NOTE - CHIEF COMPLAINT QUOTE
pt c/o hyperglycemia, pt insulin dependent diabetic, stating feeling weak and slow. typical for when her sugar is high.

## 2024-11-07 PROCEDURE — 96374 THER/PROPH/DIAG INJ IV PUSH: CPT

## 2024-11-07 PROCEDURE — 82009 KETONE BODYS QUAL: CPT

## 2024-11-07 PROCEDURE — 80053 COMPREHEN METABOLIC PANEL: CPT

## 2024-11-07 PROCEDURE — 36415 COLL VENOUS BLD VENIPUNCTURE: CPT

## 2024-11-07 PROCEDURE — 82962 GLUCOSE BLOOD TEST: CPT

## 2024-11-07 PROCEDURE — 82803 BLOOD GASES ANY COMBINATION: CPT

## 2024-11-07 PROCEDURE — 85025 COMPLETE CBC W/AUTO DIFF WBC: CPT

## 2024-11-07 PROCEDURE — 99284 EMERGENCY DEPT VISIT MOD MDM: CPT | Mod: 25

## 2024-11-07 NOTE — ED PROVIDER NOTE - OBJECTIVE STATEMENT
59-year-old female with past medical history of insulin-dependent diabetes presents with hyperglycemia.  Patient reports that she has had dietary indiscretion and has been having uncontrolled blood sugars.  She states that approximately a week ago she started to have pain and swelling to her posterior neck consistent with her many prior episodes of small abscesses.  She reports that she gets these usually when her sugars are uncontrolled for some time.  She states that today it opened and spontaneously drained all by itself.  No fever, neck stiffness.  She took her blood sugar was over 500 at home and she came in for evaluation.

## 2024-11-07 NOTE — ED PROVIDER NOTE - MUSCULOSKELETAL, MLM
Spine appears normal, range of motion is not limited, no muscle or joint ttp. +area of induration in the skin of neck, no fluctunace, punctate opening in the mild, scant purulent drainage able to be expressed

## 2024-11-07 NOTE — ED PROVIDER NOTE - CPE EDP SKIN NORM
Informed Patient  that [facility] received prescription for Repatha and prior authorization is required.  [facility] will be back in touch once insurance determination is received.     normal...

## 2024-11-07 NOTE — ED PROVIDER NOTE - PATIENT PORTAL LINK FT
You can access the FollowMyHealth Patient Portal offered by [facility] by registering at the following website: [email or web address]. By joining [employer]’s FollowMyHealth portal, you will also be able to view your health information using other applications (apps) compatible with our system.

## 2024-11-07 NOTE — ED PROVIDER NOTE - CLINICAL SUMMARY MEDICAL DECISION MAKING FREE TEXT BOX
Patient with hyperglycemia not signs of DKA.  Blood sugar controlled now, she feels well and will follow-up with her endocrinologist.  Patient likely had a small abscess that spontaneously drained, no fluctuance that needs to be incised currently, patient states that she has a 7-day supply of doxycycline at home which she has not started yet from her doctor for this exact thing, and I advised her to take it as prescribed